# Patient Record
Sex: FEMALE | Race: WHITE | HISPANIC OR LATINO | Employment: FULL TIME | URBAN - METROPOLITAN AREA
[De-identification: names, ages, dates, MRNs, and addresses within clinical notes are randomized per-mention and may not be internally consistent; named-entity substitution may affect disease eponyms.]

---

## 2024-09-22 NOTE — PROGRESS NOTES
"    80 minutes spent by me on the date of the encounter doing chart review, history and exam, documentation and further activities per the note    New Medical Weight Management Consult    PATIENT:  Liya Moore  MRN:         [id number]  :         1975  [place]:         2024    Dear Primary Care Provider ,    I had the pleasure of seeing your patient, Liya Moore. Full intake/assessment was done to determine barriers to weight loss success and develop a treatment plan. Liya Moore is a 48 year old female interested in treatment of medical problems associated with excess weight. She has a height of 5' 3.5\", a weight of 213 lbs 9.6 oz, and the calculated Body mass index is 37.24 kg/m .            Assessment & Plan   Problem List Items Addressed This Visit       Hyperlipidemia    Relevant Medications    Phentermine-Topiramate 3.75-23 MG CP24    Phentermine-Topiramate 7.5-46 MG CP24 (Start on 10/14/2024)    Other Relevant Orders    Hemoglobin A1c    Comprehensive metabolic panel    Parathyroid Hormone Intact    Vitamin D Deficiency     Other Visit Diagnoses       Class 2 severe obesity with serious comorbidity and body mass index (BMI) of 37.0 to 37.9 in adult, unspecified obesity type (H)    -  Primary    Relevant Medications    Phentermine-Topiramate 3.75-23 MG CP24    Phentermine-Topiramate 7.5-46 MG CP24 (Start on 10/14/2024)    Other Relevant Orders    Hemoglobin A1c    Comprehensive metabolic panel    Parathyroid Hormone Intact    Vitamin D Deficiency             Plan  Start qsymia 3.75-23mg daily for 30 days, after that you can increase to 7.5-46mg going forward  Goals we discussed today:   Prioritizing protein- 90g protein daily   Working towards 8 hours of sleep if able  Labs ordered today: Pth, vitamin d, cmp,  A1c   Follow up with Mindy in 3 months   Dietician appointment scheduled 24         Anti-obesity medication started today for this patient "   QSYMIA  No history of kidney stones  No history of glaucoma   No issues with memory  No chronic kidney disease   No history of cardiovascular disease  No history of cardiac arrhthymias  No history of drug abuse  Caution would be needed with this medication due to HTN, levels currently well controlled. Will monitor closely.   .     Potential anti-obesity medications for this patient the future if there are issues with cost or side effects  CONTRAVE  DID NOT DISCUSS, could consider in the future   No current opioid use  No history of liver disease  No chronic pain  No history of bipolar disorder  No history of cardiovascular disease   No history of cardiac arrhythmias   No history of seizures  No history of bulimia nervosa  No history of anorexia nervosa  Caution would be needed with this medication due to HTN, could monitor closely   .  METFORMIN  DID NOT DISCUSS: per chart review she has taken in the past, did not discuss reasons for discontinuing. Could consider in the future   Has diagnosis of prediabetes   No history of chronic kidney disease  GFR >45  .    The following medications could be considered with caution for this patient   WEGOVY  No personal or family history of medullary thyroid carcinoma  No personal or family history of Multiple Endocrine Neoplasia Type 2  Caution would be needed with this medication due to history of 2 episodes of pancreatitis in 2017 and 2019, most likely related to significant hypertriglyceridemia. Would want Liya taking trigylceride-lowering medications and see triglycerides at or around 200 prior to considering glp-1 therapy   .           Liya Carrion Oscar is a 48 year old female who presents to clinic today for the following health issues.     She has the following co-morbidities:        9/23/2024    10:12 AM   --   I have the following health issues associated with obesity Pre-Diabetes    High Blood Pressure    High Cholesterol    Fatty Liver    Hypothyroidism  "  I have the following symptoms associated with obesity Knee Pain    Depression    Lower Extremity Swelling    Fatigue            No data to display                    9/23/2024    10:12 AM   Referring Provider   Please name the provider who referred you to Medical Weight Management  If you do not know, please answer \"I Don't Know\" i dont know       Overweight onset after pregnancies, then was diagnosed with  hypothyroidism which was associated with further weight gain.   Before pregnancies weighed 115-125 lbs. After pregnancies, saw rapid weight gain with diagnosis with the hypothyroidism, recalls 30-40lbs weight. Has since seen gradual weight gain, current weight of 213lbs is highest weight in life.     Has tried losing weight with eating less, following diets high in protein and vegetables, less sugar, but has not seen sustainable weight loss. Of note, notices dark marks under her eyes when she focused on lower sugar, eating vegetables and protein. Dark marks went away under her eyes when she resumed eating more sugar.     25 years ago tried 6 months of herbalife and saw some weight loss, but weight gain with stopping.         Comorbidities associated with weight gain include hypothyroidism, prediabetes, hyperlipidemia.   Additional health issues include 2 episode of pancreatitis in 2017 and in 2019- determined to be determined to related to familial hypertrigylceridemia (triglycerides greater than 5000 in 2017, 1597 in 2019). Has since taken fenofibrate intermittently, reporting that it's very expensive out of pocket. Is not currently taking fenofibrate, triglycerides as of June 2024 is 367.     Motivators for weight loss include improving health, feeling better.     She is interested in starting a medication as a tool for working towards sustainable weight loss.    Regarding eating patterns and diet, she typically eats 2 meals a day. Craves sweets, though tries to avoid them. Is able to get full. Can stay full " until next meal. Does struggle with portion control. Does experience food noise. Does not experience emotional eating. Does not a loss of control around eating .    Eats out/ gets take out 1-2 times  a week. Drinks black coffee with stevia, sometimes coconut milk, water, 2 sodas a week (sprite). No juice. Alcohol once a month, 1 kaya in a sitting.     Regarding activity, works in cleaning, very active, 8 hours 5 days a week. Outside of work goes walking 40 minutes a day, 4-5 times a week.         Past/ Current AOMs   Ronala life 25 years ago   Nothing prescribed           9/23/2024    10:12 AM   Weight History   How concerned are you about your weight? Very Concerned   I became overweight After Pregnancy   The following factors have contributed to my weight gain Mental Health Issues    Eating Too Much    Lack of Exercise    Genetic (Runs in the Family)    Stress   I have tried the following methods to lose weight Watching Portions or Calories    Exercise    Medications    Meal Replacements    Fasting   My lowest weight since age 18 was 140   My highest weight since age 18 was 220   The most weight I have ever lost was (lbs) 60   I have the following family history of obesity/being overweight I am the only one in my immediate family who is overweight   How has your weight changed over the last year? Gained           9/23/2024    10:12 AM   Diet Recall Review with Patient   If you do eat breakfast, what types of food do you eat? coffee eggs vegetables   If you do eat lunch, what types of food do you typically eat? tortillas salads protein   If you do eat supper, what types of food do you typically eat? tortillas meat protein   If you do snack, what types of food do you typically eat? nuts and fruits/seeds   How many glasses of juice do you drink in a typical day? 0   How many of glasses of milk do you drink in a typical day? 1   If you do drink milk, what type? Skim   How many 8oz glasses of sugar containing  drinks such as Gino-Aid/sweet tea do you drink in a day? 0   How many cans/bottles of sugar pop/soda/tea/sports drinks do you drink in a day? 1   How many cans/bottles of diet pop/soda/tea or sports drink do you drink in a day? 0   How often do you have a drink of alcohol? Never           9/23/2024    10:12 AM   Eating Habits   Generally, my meals include foods like these bread, pasta, rice, potatoes, corn, crackers, sweet dessert, pop, or juice A Few Times a Week   Eat at a buffet or sit-down restaurant Once a Week   Eat most of my meals in front of the TV or computer A Few Times a Week   Often skip meals, eat at random times, have no regular eating times Less Than Weekly   Rarely sit down for a meal but snack or graze throughout Once a Week   Eat extra snacks between meals Once a Week   Eat most of my food at the end of the day Almost Everyday   Eat in the middle of the night or wake up at night to eat Never   Eat extra snacks to prevent or correct low blood sugar A Few Times a Week   Eat to prevent acid reflux or stomach pain Never   Worry about not having enough food to eat Never   I eat when I am depressed A Few Times a Week   I eat when I am stressed A Few Times a Week   I eat when I am bored A Few Times a Week   I eat when I am anxious A Few Times a Week   I eat when I am happy or as a reward A Few Times a Week   I feel hungry all the time even if I just have eaten Almost Everyday   Feeling full is important to me Everyday   I finish all the food on my plate even if I am already full Everyday   I can't resist eating delicious food or walk past the good food/smell A Few Times a Week   I eat/snack without noticing that I am eating A Few Times a Week   I eat when I am preparing the meal Everyday   I eat more than usual when I see others eating Once a Week   I have trouble not eating sweets, ice cream, cookies, or chips if they are around the house Once a Week   I think about food all day Almost Everyday   What  foods, if any, do you crave? Chips/Crackers   Please list any other foods you crave? bread           9/23/2024    10:12 AM   Amount of Food   I feel out of control when eating Weekly   I eat a large amount of food, like a loaf of bread, a box of cookies, a pint/quart of ice cream, all at once Weekly   I eat a large amount of food even when I am not hungry Weekly   I eat rapidly Monthly   I eat alone because I feel embarrassed and do not want others to see how much I have eaten Never   I eat until I am uncomfortably full Weekly   I feel bad, disgusted, or guilty after I overeat Weekly           9/23/2024    10:12 AM   Activity/Exercise History   How much of a typical 12 hour day do you spend sitting? Less Than Half the Day   How much of a typical 12 hour day do you spend lying down? Less Than Half the Day   How much of a typical day do you spend walking/standing? Most of the Day   How many hours (not including work) do you spend on the TV/Video Games/Computer/Tablet/Phone? 4-5 Hours   How many times a week are you active for the purpose of exercise? Once a Week   What keeps you from being more active? Lack of Time    Too tired   How many total minutes do you spend doing some activity for the purpose of exercising when you exercise? 15-30 Minutes       PAST MEDICAL HISTORY:  No past medical history on file.        9/23/2024    10:12 AM   Work/Social History Reviewed With Patient   My employment status is Full-Time   My job is cleaning   How much of your job is spent on the computer or phone? Less Than 50%   How many hours do you spend commuting to work daily? 1   What is your marital status? /In a Relationship   If in a relationship, is your significant other overweight? No   If you have children, are they overweight? Yes   Who do you live with?  daughter son   Who does the food shopping? me       Marijuana use- none   Alcohol use -1 kaya   Caffeine use : coffee daily with stevia and coconut milk  "            9/23/2024    10:12 AM   Mental Health History Reviewed With Patient   Have you ever been physically or sexually abused? No   How often in the past 2 weeks have you felt little interest or pleasure in doing things? For Several Days   Over the past 2 weeks how often have you felt down, depressed, or hopeless? For Several Days             9/23/2024    10:12 AM   Sleep History Reviewed With Patient   How many hours do you sleep at night? 6   Estimates 6 hours of sleep, struggles to get more due to busy schedule. Feels a little tired in the morning.   Works late nights, gets to bed around 1-2am.     MEDICATIONS:   Current Outpatient Medications   Medication Sig Dispense Refill    acetaminophen (TYLENOL) 325 MG tablet 2 tablets as needed Orally every 4 hrs      labetalol (NORMODYNE) 100 MG tablet Take 100 mg by mouth.      levothyroxine (SYNTHROID/LEVOTHROID) 175 MCG tablet Take 175 mcg by mouth daily.      Phentermine-Topiramate 3.75-23 MG CP24 Take 1 tablet by mouth daily. For 1 month 30 capsule 0    [START ON 10/14/2024] Phentermine-Topiramate 7.5-46 MG CP24 Take 1 tablet by mouth daily. After completing 30 days of lower dose 30 capsule 1    vitamin D2 (ERGOCALCIFEROL) 94831 units (1250 mcg) capsule       Vitamin D3 (CHOLECALCIFEROL) 25 mcg (1000 units) tablet Take 1,000 Units by mouth.      citalopram (CELEXA) 20 MG tablet 1 tablet Orally Once a day             ALLERGIES:   No Known Allergies        9/23/2024     9:37 AM   NIRALI Score (Last Two)   NIRALI Raw Score 30   Activation Score 56   NIRALI Level 3               Objective    /85 (BP Location: Left arm, Patient Position: Chair, Cuff Size: Adult Large)   Pulse 58   Ht 1.613 m (5' 3.5\")   Wt 96.9 kg (213 lb 9.6 oz)   SpO2 100%   BMI 37.24 kg/m    /85 (BP Location: Left arm, Patient Position: Chair, Cuff Size: Adult Large)   Pulse 58   Ht 1.613 m (5' 3.5\")   Wt 96.9 kg (213 lb 9.6 oz)   SpO2 100%   BMI 37.24 kg/m    Body mass index is " 37.24 kg/m .  Physical Exam   GENERAL: alert and no distress  EYES: Eyes grossly normal to inspection.  No discharge or erythema, or obvious scleral/conjunctival abnormalities.  RESP: No audible wheeze, cough, or visible cyanosis.    SKIN: Visible skin clear. No significant rash, abnormal pigmentation or lesions.  NEURO: Cranial nerves grossly intact.  Mentation and speech appropriate for age.  PSYCH: Appropriate affect, tone, and pace of words     Anti-obesity medication ROS:    HEENT  Hx of glaucoma: No    Cardiovascular  CAD:No  HTN:Yes      Gastrointestinal  GERD:No  Constipation: occasionally, doesn't take anything for it   Liver Dz:No  H/O Pancreatitis:Yes see hpi     Psychiatric  Bipolar: No  Anxiety:No  Depression:No  History of alcohol/drug abuse: No  Hx of eating disorder:No    Endocrine  Personal or family hx of MTC or MEN2:No  Diabetes/prediabetes: Yes prediabetes     Neurologic:  Hx of seizures: No  Hx of migraines: No  Memory Impairment: No  CVA history: No      History of kidney stones: No  Kidney disease: No  Current birth control: No no period for the last 2 years   Interested in future pregnancy: No    Taking Opioid/Narcotic: No        Sincerely,    Mindy Morgan PA-C     The longitudinal plan of care for the diagnosis(es)/condition(s) as documented were addressed during this visit. Due to the added complexity in care, I will continue to support Liya in the subsequent management and with ongoing continuity of care.

## 2024-09-23 ENCOUNTER — APPOINTMENT (OUTPATIENT)
Dept: LAB | Facility: CLINIC | Age: 49
End: 2024-09-23
Payer: COMMERCIAL

## 2024-09-23 ENCOUNTER — TELEPHONE (OUTPATIENT)
Dept: ENDOCRINOLOGY | Facility: CLINIC | Age: 49
End: 2024-09-23

## 2024-09-23 ENCOUNTER — OFFICE VISIT (OUTPATIENT)
Dept: ENDOCRINOLOGY | Facility: CLINIC | Age: 49
End: 2024-09-23
Payer: COMMERCIAL

## 2024-09-23 VITALS
SYSTOLIC BLOOD PRESSURE: 128 MMHG | BODY MASS INDEX: 36.47 KG/M2 | DIASTOLIC BLOOD PRESSURE: 85 MMHG | WEIGHT: 213.6 LBS | HEIGHT: 64 IN | OXYGEN SATURATION: 100 % | HEART RATE: 58 BPM

## 2024-09-23 DIAGNOSIS — E66.01 CLASS 2 SEVERE OBESITY WITH SERIOUS COMORBIDITY AND BODY MASS INDEX (BMI) OF 37.0 TO 37.9 IN ADULT, UNSPECIFIED OBESITY TYPE (H): Primary | ICD-10-CM

## 2024-09-23 DIAGNOSIS — E78.5 HYPERLIPIDEMIA, UNSPECIFIED HYPERLIPIDEMIA TYPE: ICD-10-CM

## 2024-09-23 DIAGNOSIS — E66.812 CLASS 2 SEVERE OBESITY WITH SERIOUS COMORBIDITY AND BODY MASS INDEX (BMI) OF 37.0 TO 37.9 IN ADULT, UNSPECIFIED OBESITY TYPE (H): Primary | ICD-10-CM

## 2024-09-23 DIAGNOSIS — R79.89 LOW VITAMIN D LEVEL: ICD-10-CM

## 2024-09-23 PROBLEM — K85.90 PANCREATITIS, UNSPECIFIED PANCREATITIS TYPE: Status: ACTIVE | Noted: 2017-07-05

## 2024-09-23 PROBLEM — R63.5 WEIGHT GAIN: Status: ACTIVE | Noted: 2023-04-27

## 2024-09-23 PROBLEM — E78.00 PURE HYPERCHOLESTEROLEMIA: Status: ACTIVE | Noted: 2023-05-11

## 2024-09-23 PROBLEM — M26.4 MALOCCLUSION OF TEETH: Status: ACTIVE | Noted: 2017-01-03

## 2024-09-23 PROBLEM — L98.9 SKIN LESION: Status: ACTIVE | Noted: 2024-07-30

## 2024-09-23 PROBLEM — Z87.19 HISTORY OF PANCREATITIS: Status: ACTIVE | Noted: 2024-01-11

## 2024-09-23 PROBLEM — N83.519 OVARIAN TORSION: Status: ACTIVE | Noted: 2018-03-31

## 2024-09-23 PROBLEM — R89.9 ABNORMAL LABORATORY TEST RESULT: Status: ACTIVE | Noted: 2023-05-11

## 2024-09-23 PROBLEM — Z72.820 SLEEP DEPRIVATION: Status: ACTIVE | Noted: 2023-05-11

## 2024-09-23 LAB
ALBUMIN SERPL BCG-MCNC: 4.3 G/DL (ref 3.5–5.2)
ALP SERPL-CCNC: 120 U/L (ref 40–150)
ALT SERPL W P-5'-P-CCNC: 28 U/L (ref 0–50)
ANION GAP SERPL CALCULATED.3IONS-SCNC: 10 MMOL/L (ref 7–15)
AST SERPL W P-5'-P-CCNC: 23 U/L (ref 0–45)
BILIRUB SERPL-MCNC: 0.2 MG/DL
BUN SERPL-MCNC: 13.8 MG/DL (ref 6–20)
CALCIUM SERPL-MCNC: 9.5 MG/DL (ref 8.8–10.4)
CHLORIDE SERPL-SCNC: 107 MMOL/L (ref 98–107)
CREAT SERPL-MCNC: 0.64 MG/DL (ref 0.51–0.95)
EGFRCR SERPLBLD CKD-EPI 2021: >90 ML/MIN/1.73M2
EST. AVERAGE GLUCOSE BLD GHB EST-MCNC: 134 MG/DL
GLUCOSE SERPL-MCNC: 97 MG/DL (ref 70–99)
HBA1C MFR BLD: 6.3 %
HCO3 SERPL-SCNC: 23 MMOL/L (ref 22–29)
POTASSIUM SERPL-SCNC: 4.5 MMOL/L (ref 3.4–5.3)
PROT SERPL-MCNC: 8 G/DL (ref 6.4–8.3)
PTH-INTACT SERPL-MCNC: 70 PG/ML (ref 15–65)
SODIUM SERPL-SCNC: 140 MMOL/L (ref 135–145)
VIT D+METAB SERPL-MCNC: 19 NG/ML (ref 20–50)

## 2024-09-23 PROCEDURE — 36415 COLL VENOUS BLD VENIPUNCTURE: CPT | Performed by: PATHOLOGY

## 2024-09-23 PROCEDURE — G2211 COMPLEX E/M VISIT ADD ON: HCPCS

## 2024-09-23 PROCEDURE — 82306 VITAMIN D 25 HYDROXY: CPT

## 2024-09-23 PROCEDURE — 80053 COMPREHEN METABOLIC PANEL: CPT | Performed by: PATHOLOGY

## 2024-09-23 PROCEDURE — 83970 ASSAY OF PARATHORMONE: CPT | Performed by: PATHOLOGY

## 2024-09-23 PROCEDURE — 99000 SPECIMEN HANDLING OFFICE-LAB: CPT | Performed by: PATHOLOGY

## 2024-09-23 PROCEDURE — 83036 HEMOGLOBIN GLYCOSYLATED A1C: CPT

## 2024-09-23 PROCEDURE — T1013 SIGN LANG/ORAL INTERPRETER: HCPCS | Mod: GT

## 2024-09-23 PROCEDURE — 99205 OFFICE O/P NEW HI 60 MIN: CPT

## 2024-09-23 PROCEDURE — 99417 PROLNG OP E/M EACH 15 MIN: CPT

## 2024-09-23 RX ORDER — LABETALOL 100 MG/1
100 TABLET, FILM COATED ORAL
COMMUNITY

## 2024-09-23 RX ORDER — VITAMIN B COMPLEX
1000 TABLET ORAL
COMMUNITY
Start: 2022-12-29

## 2024-09-23 RX ORDER — CITALOPRAM HYDROBROMIDE 20 MG/1
TABLET ORAL
COMMUNITY

## 2024-09-23 RX ORDER — ACETAMINOPHEN 325 MG/1
TABLET ORAL
COMMUNITY

## 2024-09-23 RX ORDER — ERGOCALCIFEROL 1.25 MG/1
50000 CAPSULE, LIQUID FILLED ORAL WEEKLY
Qty: 12 CAPSULE | Refills: 0 | Status: SHIPPED | OUTPATIENT
Start: 2024-09-23

## 2024-09-23 RX ORDER — LEVOTHYROXINE SODIUM 175 UG/1
175 TABLET ORAL DAILY
COMMUNITY

## 2024-09-23 RX ORDER — ERGOCALCIFEROL 1.25 MG/1
CAPSULE, LIQUID FILLED ORAL
COMMUNITY
Start: 2024-07-02 | End: 2024-09-23

## 2024-09-23 ASSESSMENT — SLEEP AND FATIGUE QUESTIONNAIRES
HOW LIKELY ARE YOU TO NOD OFF OR FALL ASLEEP WHILE SITTING QUIETLY AFTER LUNCH WITHOUT ALCOHOL: SLIGHT CHANCE OF DOZING
HOW LIKELY ARE YOU TO NOD OFF OR FALL ASLEEP WHILE SITTING INACTIVE IN A PUBLIC PLACE: WOULD NEVER DOZE
HOW LIKELY ARE YOU TO NOD OFF OR FALL ASLEEP WHILE WATCHING TV: SLIGHT CHANCE OF DOZING
HOW LIKELY ARE YOU TO NOD OFF OR FALL ASLEEP IN A CAR, WHILE STOPPED FOR A FEW MINUTES IN TRAFFIC: WOULD NEVER DOZE
HOW LIKELY ARE YOU TO NOD OFF OR FALL ASLEEP WHILE SITTING AND TALKING TO SOMEONE: WOULD NEVER DOZE
HOW LIKELY ARE YOU TO NOD OFF OR FALL ASLEEP WHILE LYING DOWN TO REST IN THE AFTERNOON WHEN CIRCUMSTANCES PERMIT: SLIGHT CHANCE OF DOZING
HOW LIKELY ARE YOU TO NOD OFF OR FALL ASLEEP WHILE SITTING AND READING: SLIGHT CHANCE OF DOZING
HOW LIKELY ARE YOU TO NOD OFF OR FALL ASLEEP WHEN YOU ARE A PASSENGER IN A CAR FOR AN HOUR WITHOUT A BREAK: SLIGHT CHANCE OF DOZING

## 2024-09-23 ASSESSMENT — PAIN SCALES - GENERAL: PAINLEVEL: NO PAIN (0)

## 2024-09-23 NOTE — NURSING NOTE
"Chief Complaint   Patient presents with    New Patient     New MWM consult       Vitals:    09/23/24 1014   BP: 128/85   BP Location: Left arm   Patient Position: Chair   Cuff Size: Adult Large   Pulse: 58   SpO2: 100%   Weight: 96.9 kg (213 lb 9.6 oz)   Height: 1.613 m (5' 3.5\")       Body mass index is 37.24 kg/m .                              "

## 2024-09-23 NOTE — TELEPHONE ENCOUNTER
"Prior Authorization Retail Medication Request    Medication/Dose: Qsymia  Diagnosis and ICD code (if different than what is on RX):    Class 2 severe obesity with serious comorbidity and body mass index (BMI) of 37.0 to 37.9 in adult, unspecified obesity type [E66.01, Z68.37]  - Primary      Hyperlipidemia, unspecified hyperlipidemia type [E78.5]          New/renewal/insurance change PA/secondary ins. PA:  Previously Tried and Failed:  history of diet and exercise, herbalife  Rationale:  I had the pleasure of seeing your patient, Liya Moore. Full intake/assessment was done to determine barriers to weight loss success and develop a treatment plan. Liya Moore is a 48 year old female interested in treatment of medical problems associated with excess weight. She has a height of 5' 3.5\", a weight of 213 lbs 9.6 oz, and the calculated Body mass index is 37.24 kg/m . She has the following co-morbidities: Pre-Diabetes    High Blood Pressure    High Cholesterol    Fatty Liver    Hypothyroidism    Overweight onset after pregnancies, then was diagnosed with  hypothyroidism which was associated with further weight gain.   Before pregnancies weighed 115-125 lbs. Saw rapid weight gain with diagnosis with the hypothyroidism, recalls 30-40lbs. Has since seen gradual weight gain, current weight is 213lbs.      Has tried losing weight with eating less, following diets high in protein and vegetables, less sugar, but has not seen sustainable weight loss. Of note, notices dark marks under her eyes when she focused on lower sugar, eating vegetables and protein. Dark marks went away under her eyes when she resumed eating more sugar.         25 years tried 6 months of herbalife and saw some weight loss, but weight gain with stopping.      Current weight of 213lbs is highest weight in life.      Comorbidities associated with weight gain include hypothyroidism, prediabetes, hyperlipidemia.   Additional health " issues include one episode of pancreatitis- 1 week after removal of appendix- determined to be possible complication related to surgery, hypertriglyceridemia.       Motivators for weight loss include improving health, feeling better.      She is interested in starting a medication as a tool for working towards sustainable weight loss.     Regarding eating patterns and diet, she typically eats 2 meals a day. Craves sweets, though tries to avoid them. Is able to get full. Can stay full until next meal. Does struggle with portion control. Does experience food noise. Does not experience emotional eating. Does not a loss of control around eating .     Eats out/ gets take out 1-2 times  a week. Drinks black coffee with stevia, sometimes coconut milk, water, 2 sodas a week (sprite). No juice. Alcohol once a month, 1 kaya in a sitting.      Regarding activity, works in cleaning, very active, 8 hours 5 days a week. Outside of work goes walking 40 minutes a day, 4-5 times a week.         Past/ Current AOMs   Herba life 25 years ago   Nothing prescribed     Insurance   Primary:   Insurance ID:      Secondary (if applicable):  Insurance ID:      Pharmacy Information (if different than what is on RX)  Name:    [id number] [place] [facility] [address] [facility]     Phone:  [phone]   Fax: [phone]

## 2024-09-23 NOTE — PATIENT INSTRUCTIONS
"Thank you for allowing us the privilege of caring for you. We hope we provided you with the excellent service you deserve.   Please let us know if there is anything else we can do for you so that we can be sure you are completely satisfied with your care experience.    To ensure the quality of our services you may be receiving a patient satisfaction survey from an independent patient satisfaction monitoring company.    The greatest compliment you can give is a \"Likely to Recommend\"    Your visit was with Mindy Morgan PA-C today.    Instructions per today's visit:     Devante Moore, it was great to visit with you today.  Here is a review of our visit.  If our clinic scheduler is not able to reach you please call [phone] to schedule your next appointments.    Plan  Start qsymia 3.75-23mg daily for 30 days, after that you can increase to 7.5-46mg going forward  Goals we discussed today:   Prioritizing protein- 90g protein daily   Working towards 8 hours of sleep if able  Labs ordered today: Pth, vitamin d, cmp,  A1c   Follow up with Mindy in 3 months   Dietician appointment scheduled 9/26/24       Information about Video Visits with [facility]: video visit information  _________________________________________________________________________________________________________________________________________________________  If you are asked by your clinic team to have your blood pressure checked:  [facility] do offer several locations for blood pressure checks. Please follow the below link to schedule an appointment. Scheduling an appointment at the pharmacy for a blood pressure check is now preferred.    Appointment Plus (appointment-plus.[employer])  _________________________________________________________________________________________________________________________________________________________  Important contact and scheduling information:  Please call our contact center at " [phone] to schedule your next appointments.  To find a lab location near you, please call [phone].  For any nursing questions or concerns call Mily Duncan LPN at [phone] or Macey Mccullough RN at [phone]  Please call during clinic hours Monday through Friday 8:00a - 4:00p if you have questions or you can contact us via [employer] at anytime and we will reply during clinic hours.    Lab results will be communicated through My Chart or letter (if My Chart not used). Please call the clinic if you have not received communication after 1 week or if you have any questions.?  Clinic Fax: [phone]    _________________________________________________________________________________________________________________________________________________________  Meal Replacement Products:    Here is the link to our new e-store where you can purchase our meal replacement products    [facility] E-Store  [email or web address]    The one week starter kit is a great way to sample a variety of products and see what works for you.    If you want more information about the product go to: [email or web address]    If you are an employee or [facility] or [facility] please contact your care team for a 10% estore discount    Free Shipping for orders over $75     Benefits of meal replacements products:    Portion and calorie control  Improved nutrition  Structured eating  Simplified food choices  Avoid contact with trigger foods  _________________________________________________________________________________________________________________________________________________________  Interested in working with a health ?  Health coaches work with you to improve your overall health and wellbeing.  They look at the whole person, and may involve discussion of different areas of life, including, but not limited to the four pillars of health (sleep, exercise, nutrition, and  stress management). Discuss with your care team if you would like to start working a health .  Health Coaching-3 Pack: Schedule by calling [phone]    $99 for three health coaching visits    Visits may be done in person or via phone    Coaching is a partnership between the  and the client; Coaches do not prescribe or diagnose    Coaching helps inspire the client to reach his/her personal goals   _________________________________________________________________________________________________________________________________________________________  24 Week Healthy Lifestyle Plan:    Our mission in the 24-week Healthy Lifestyle Plan is to provide you with individualized care by giving you the tools, education and support you need to lose weight and maintain a healthy lifestyle. In your 24-week journey, you ll be supported by a dedicated weight loss team that includes registered dietitians, medical weight management providers, health coaches, and nurses -- all with special expertise in weight loss -- to help you every step of the way.     Monthly meetings with your registered dietician or medical weight management provider help to review your progress, update your care plan, and make any adjustments needed to ensure success. Between these visits, weekly and bi-weekly health  visits will help you focus on the four pillars of weight loss -- stress, sleep, nutrition, and exercise -- and how you can best adapt each to achieve sustainable weight loss results.    In addition, you will be given exclusive access to online wellbeing classes through [employer].  Your initial visit will be with a medical weight management provider who will help to understand your weight loss goals and ensure this program is the right fit for you. Please let our team know if you are interested in the 24 week plan by sending a message to your care team or calling [phone] to  schedule.  _________________________________________________________________________________________________________________________________________________________  __________  [facility] Get Moving Program  Our team of physical therapists is trained to help you understand and take control of your condition. They will perform a thorough evaluation to determine your ability for activity and develop a customized plan to fit your goals and physical ability.  Scheduling: Unsure if the Get Moving program is right for you? Discuss the program with your medical provider or diabetes educator. You can also call us at [phone] to ask questions or schedule an appointment.   [facility] Get Moving Program  ____________________________________________________________________________________________________________________________________________________________________________   [facility] Diabetes Prevention Program (DPP)  If you have prediabetes and Medicare please contact us via [employer] to learn more about the Diabetes Prevention Program (DPP)  Program Details:   [facility] offers the year-long Diabetes Prevention Program (DPP). The program helps you to make lifestyle changes that prevent or delay type 2 diabetes by supporting healthy eating, increased physical activity, stress reduction and use of coping skills.   On average, previous [facility] DPP cohorts have lost and maintained at least 5% of their starting weight throughout the program and averaged more than 150 minutes of physical activity per week.  Participants meet weekly for one-hour group sessions over sixteen weeks, every other week for the next 8 weeks, and monthly for the last six months.   A year-long maintenance program is also available for participants who complete the first year.   Location & Cost:   During the COVID-19 Public Health Emergency, the program is offered virtually. When in-person classes can resume, they  will be held at [facility].  For people with Medicare, the program is covered in full. A self-pay option will also be available for those with non-Medicare insurance plans.   ______________________________________________________________________________________________________________________________________________________________________________________________________________________________    To work with a Behavioral Health Psychologist:    Call to schedule:    Amish Will - [phone]  Troy Nunez - [phone]  Adeline Hansen - [phone]  Rachel Rust - [phone]   Jenae Ulloa PhD (cannot accept Medicare) [phone]        Thank you,   [facility] Comprehensive Weight Management Team        QSYMIA (phentermine and topiramate)    We are considering starting Qsymia. This is a specific obesity medication and is a combination of sustained-release Phentermine and Topiramate. Qsymia is taken once daily in the morning.  There are a few different doses of the medication. Doses come in 3.75/23 mg (usually skipped), 7.5/46 mg, 11.25/69 mg, and 15/92 mg.     For some of our patients, the pills work right away. They feel and think quite differently about food. Other patients don't feel much of a change but find in fact they have lost weight! Like all weight loss medications, Qsymia works best when you help it work.  This means:    1) Have less tempting high calorie (fattening) food around the house or office    2) Have lower calorie food (fruits, vegetables,low fat meats and dairy) for snacks    3) Eat out only one time or less each week.   4) Eat your meals at a table with the TV or computer off.    Side-effects (generally well tolerated because it is a sustained release medication)    Topiramate:   -Tingling in hands,feet, or face (usually not very troublesome)   -Mental confusion and word finding trouble (about 10% of patients have this)     -Feeling sleepy  or a bit dopey- this goes away very soon after starting      Phentermine:    -Feelings of racing pulse or rapid heart beat   -Increased anxiety/jitteriness   -Insomnia   -Dry mouth, constipation    -Some people can get an elevated blood pressure. Please have your blood pressure rechecked 1-2 weeks after starting Qsymia and report results back to the clinic via [employer]    One of the dangers of topiramate is the possibility of birth defects--if you get pregnant when you are on it, there is the risk that your baby will be born with a cleft lip or palate.  If you are on topiramate and of child bearing age, you need to be on a reliable form of birth control or refrain from sexual intercourse.     Prior Authorization Process for Weight Management Medications: You are being prescribed a medication that will likely need to undergo a prior authorization.  A prior authorization is when the clinic needs to fill out a form that is sent to your insurance company to obtain coverage for that medication. The prescription will NOT automatically go to your pharmacy today if it needs a Prior Authorization. If the medication prior authorization is approved, the care team will contact you and the prescription will be released to your pharmacy. If denied, we will work to try and appeal the prior authorization if possible. The initial prior authorization process takes up to 5-10 business days and appeals can take up to 30 days. If you do not hear from us at the end of that time, you may contact the clinic.    If insurance does not cover Qsymia, typically costs around $150-$200 per month. Please check out the website [email or web address] and sign up for the Pharmacy savings card or if you would like to use [facility] for a lower copay.     For any questions or concerns please send a [employer] message to our team or call our weight management call center at [phone] during regular business hours. For questions during  evenings or weekends your messages will be addressed during the next business day.  For emergencies please call 911 or seek immediate medical care.     Do not stop taking it if you don't think it's working. For some people it works even though they do not feel much different.    In order to get refills of this or any medication we prescribe you must be seen in the medical weight mgmt clinic every 2-4 months. Please have your pharmacy fax a refill request to [phone].

## 2024-09-23 NOTE — LETTER
"2024       RE: Liya Moore  [address]  [place] [address]     Dear Colleague,    Thank you for referring your patient, Liya Moore, to the [facility]. Please see a copy of my visit note below.        80 minutes spent by me on the date of the encounter doing chart review, history and exam, documentation and further activities per the note    New Medical Weight Management Consult    PATIENT:  Liya Moore  MRN:         [id number]  :         1975  [place]:         2024    Dear Primary Care Provider ,    I had the pleasure of seeing your patient, Liya Moore. Full intake/assessment was done to determine barriers to weight loss success and develop a treatment plan. Liya Moore is a 48 year old female interested in treatment of medical problems associated with excess weight. She has a height of 5' 3.5\", a weight of 213 lbs 9.6 oz, and the calculated Body mass index is 37.24 kg/m .            Assessment & Plan  Problem List Items Addressed This Visit       Hyperlipidemia    Relevant Medications    Phentermine-Topiramate 3.75-23 MG CP24    Phentermine-Topiramate 7.5-46 MG CP24 (Start on 10/14/2024)    Other Relevant Orders    Hemoglobin A1c    Comprehensive metabolic panel    Parathyroid Hormone Intact    Vitamin D Deficiency     Other Visit Diagnoses       Class 2 severe obesity with serious comorbidity and body mass index (BMI) of 37.0 to 37.9 in adult, unspecified obesity type (H)    -  Primary    Relevant Medications    Phentermine-Topiramate 3.75-23 MG CP24    Phentermine-Topiramate 7.5-46 MG CP24 (Start on 10/14/2024)    Other Relevant Orders    Hemoglobin A1c    Comprehensive metabolic panel    Parathyroid Hormone Intact    Vitamin D Deficiency             Plan  Start qsymia 3.75-23mg daily for 30 days, after that you can " increase to 7.5-46mg going forward  Goals we discussed today:   Prioritizing protein- 90g protein daily   Working towards 8 hours of sleep if able  Labs ordered today: Pth, vitamin d, cmp,  A1c   Follow up with Mindy in 3 months   Dietician appointment scheduled 9/26/24         Anti-obesity medication started today for this patient   QSYMIA  No history of kidney stones  No history of glaucoma   No issues with memory  No chronic kidney disease   No history of cardiovascular disease  No history of cardiac arrhthymias  No history of drug abuse  Caution would be needed with this medication due to HTN, levels currently well controlled. Will monitor closely.   .     Potential anti-obesity medications for this patient the future if there are issues with cost or side effects  CONTRAVE  DID NOT DISCUSS, could consider in the future   No current opioid use  No history of liver disease  No chronic pain  No history of bipolar disorder  No history of cardiovascular disease   No history of cardiac arrhythmias   No history of seizures  No history of bulimia nervosa  No history of anorexia nervosa  Caution would be needed with this medication due to HTN, could monitor closely   .  METFORMIN  DID NOT DISCUSS: per chart review she has taken in the past, did not discuss reasons for discontinuing. Could consider in the future   Has diagnosis of prediabetes   No history of chronic kidney disease  GFR >45  .    The following medications could be considered with caution for this patient   WEGOVY  No personal or family history of medullary thyroid carcinoma  No personal or family history of Multiple Endocrine Neoplasia Type 2  Caution would be needed with this medication due to history of 2 episodes of pancreatitis in 2017 and 2019, most likely related to significant hypertriglyceridemia. Would want Liya taking trigylceride-lowering medications and see triglycerides at or around 200 prior to considering glp-1 therapy   .  "          Liya Moore is a 48 year old female who presents to clinic today for the following health issues.     She has the following co-morbidities:        9/23/2024    10:12 AM   --   I have the following health issues associated with obesity Pre-Diabetes    High Blood Pressure    High Cholesterol    Fatty Liver    Hypothyroidism   I have the following symptoms associated with obesity Knee Pain    Depression    Lower Extremity Swelling    Fatigue            No data to display                    9/23/2024    10:12 AM   Referring Provider   Please name the provider who referred you to Medical Weight Management  If you do not know, please answer \"I Don't Know\" i dont know       Overweight onset after pregnancies, then was diagnosed with  hypothyroidism which was associated with further weight gain.   Before pregnancies weighed 115-125 lbs. After pregnancies, saw rapid weight gain with diagnosis with the hypothyroidism, recalls 30-40lbs weight. Has since seen gradual weight gain, current weight of 213lbs is highest weight in life.     Has tried losing weight with eating less, following diets high in protein and vegetables, less sugar, but has not seen sustainable weight loss. Of note, notices dark marks under her eyes when she focused on lower sugar, eating vegetables and protein. Dark marks went away under her eyes when she resumed eating more sugar.     25 years ago tried 6 months of herbalife and saw some weight loss, but weight gain with stopping.         Comorbidities associated with weight gain include hypothyroidism, prediabetes, hyperlipidemia.   Additional health issues include 2 episode of pancreatitis in 2017 and in 2019- determined to be determined to related to familial hypertrigylceridemia (triglycerides greater than 5000 in 2017, 1597 in 2019). Has since taken fenofibrate intermittently, reporting that it's very expensive out of pocket. Is not currently taking fenofibrate, triglycerides as " of June 2024 is 367.     Motivators for weight loss include improving health, feeling better.     She is interested in starting a medication as a tool for working towards sustainable weight loss.    Regarding eating patterns and diet, she typically eats 2 meals a day. Craves sweets, though tries to avoid them. Is able to get full. Can stay full until next meal. Does struggle with portion control. Does experience food noise. Does not experience emotional eating. Does not a loss of control around eating .    Eats out/ gets take out 1-2 times  a week. Drinks black coffee with stevia, sometimes coconut milk, water, 2 sodas a week (sprite). No juice. Alcohol once a month, 1 kaya in a sitting.     Regarding activity, works in cleaning, very active, 8 hours 5 days a week. Outside of work goes walking 40 minutes a day, 4-5 times a week.         Past/ Current AOMs   Pretty life 25 years ago   Nothing prescribed           9/23/2024    10:12 AM   Weight History   How concerned are you about your weight? Very Concerned   I became overweight After Pregnancy   The following factors have contributed to my weight gain Mental Health Issues    Eating Too Much    Lack of Exercise    Genetic (Runs in the Family)    Stress   I have tried the following methods to lose weight Watching Portions or Calories    Exercise    Medications    Meal Replacements    Fasting   My lowest weight since age 18 was 140   My highest weight since age 18 was 220   The most weight I have ever lost was (lbs) 60   I have the following family history of obesity/being overweight I am the only one in my immediate family who is overweight   How has your weight changed over the last year? Gained           9/23/2024    10:12 AM   Diet Recall Review with Patient   If you do eat breakfast, what types of food do you eat? coffee eggs vegetables   If you do eat lunch, what types of food do you typically eat? tortillas salads protein   If you do eat supper, what types  of food do you typically eat? tortillas meat protein   If you do snack, what types of food do you typically eat? nuts and fruits/seeds   How many glasses of juice do you drink in a typical day? 0   How many of glasses of milk do you drink in a typical day? 1   If you do drink milk, what type? Skim   How many 8oz glasses of sugar containing drinks such as Gino-Aid/sweet tea do you drink in a day? 0   How many cans/bottles of sugar pop/soda/tea/sports drinks do you drink in a day? 1   How many cans/bottles of diet pop/soda/tea or sports drink do you drink in a day? 0   How often do you have a drink of alcohol? Never           9/23/2024    10:12 AM   Eating Habits   Generally, my meals include foods like these bread, pasta, rice, potatoes, corn, crackers, sweet dessert, pop, or juice A Few Times a Week   Eat at a buffet or sit-down restaurant Once a Week   Eat most of my meals in front of the TV or computer A Few Times a Week   Often skip meals, eat at random times, have no regular eating times Less Than Weekly   Rarely sit down for a meal but snack or graze throughout Once a Week   Eat extra snacks between meals Once a Week   Eat most of my food at the end of the day Almost Everyday   Eat in the middle of the night or wake up at night to eat Never   Eat extra snacks to prevent or correct low blood sugar A Few Times a Week   Eat to prevent acid reflux or stomach pain Never   Worry about not having enough food to eat Never   I eat when I am depressed A Few Times a Week   I eat when I am stressed A Few Times a Week   I eat when I am bored A Few Times a Week   I eat when I am anxious A Few Times a Week   I eat when I am happy or as a reward A Few Times a Week   I feel hungry all the time even if I just have eaten Almost Everyday   Feeling full is important to me Everyday   I finish all the food on my plate even if I am already full Everyday   I can't resist eating delicious food or walk past the good food/smell A Few  Times a Week   I eat/snack without noticing that I am eating A Few Times a Week   I eat when I am preparing the meal Everyday   I eat more than usual when I see others eating Once a Week   I have trouble not eating sweets, ice cream, cookies, or chips if they are around the house Once a Week   I think about food all day Almost Everyday   What foods, if any, do you crave? Chips/Crackers   Please list any other foods you crave? bread           9/23/2024    10:12 AM   Amount of Food   I feel out of control when eating Weekly   I eat a large amount of food, like a loaf of bread, a box of cookies, a pint/quart of ice cream, all at once Weekly   I eat a large amount of food even when I am not hungry Weekly   I eat rapidly Monthly   I eat alone because I feel embarrassed and do not want others to see how much I have eaten Never   I eat until I am uncomfortably full Weekly   I feel bad, disgusted, or guilty after I overeat Weekly           9/23/2024    10:12 AM   Activity/Exercise History   How much of a typical 12 hour day do you spend sitting? Less Than Half the Day   How much of a typical 12 hour day do you spend lying down? Less Than Half the Day   How much of a typical day do you spend walking/standing? Most of the Day   How many hours (not including work) do you spend on the TV/Video Games/Computer/Tablet/Phone? 4-5 Hours   How many times a week are you active for the purpose of exercise? Once a Week   What keeps you from being more active? Lack of Time    Too tired   How many total minutes do you spend doing some activity for the purpose of exercising when you exercise? 15-30 Minutes       PAST MEDICAL HISTORY:  No past medical history on file.        9/23/2024    10:12 AM   Work/Social History Reviewed With Patient   My employment status is Full-Time   My job is cleaning   How much of your job is spent on the computer or phone? Less Than 50%   How many hours do you spend commuting to work daily? 1   What is your  marital status? /In a Relationship   If in a relationship, is your significant other overweight? No   If you have children, are they overweight? Yes   Who do you live with?  daughter son   Who does the food shopping? me       Marijuana use- none   Alcohol use -1 kaya   Caffeine use : coffee daily with stevia and coconut milk             9/23/2024    10:12 AM   Mental Health History Reviewed With Patient   Have you ever been physically or sexually abused? No   How often in the past 2 weeks have you felt little interest or pleasure in doing things? For Several Days   Over the past 2 weeks how often have you felt down, depressed, or hopeless? For Several Days             9/23/2024    10:12 AM   Sleep History Reviewed With Patient   How many hours do you sleep at night? 6   Estimates 6 hours of sleep, struggles to get more due to busy schedule. Feels a little tired in the morning.   Works late nights, gets to bed around 1-2am.     MEDICATIONS:   Current Outpatient Medications   Medication Sig Dispense Refill     acetaminophen (TYLENOL) 325 MG tablet 2 tablets as needed Orally every 4 hrs       labetalol (NORMODYNE) 100 MG tablet Take 100 mg by mouth.       levothyroxine (SYNTHROID/LEVOTHROID) 175 MCG tablet Take 175 mcg by mouth daily.       Phentermine-Topiramate 3.75-23 MG CP24 Take 1 tablet by mouth daily. For 1 month 30 capsule 0     [START ON 10/14/2024] Phentermine-Topiramate 7.5-46 MG CP24 Take 1 tablet by mouth daily. After completing 30 days of lower dose 30 capsule 1     vitamin D2 (ERGOCALCIFEROL) 69005 units (1250 mcg) capsule        Vitamin D3 (CHOLECALCIFEROL) 25 mcg (1000 units) tablet Take 1,000 Units by mouth.       citalopram (CELEXA) 20 MG tablet 1 tablet Orally Once a day             ALLERGIES:   No Known Allergies        9/23/2024     9:37 AM   NIRALI Score (Last Two)   NIRALI Raw Score 30   Activation Score 56   NIRALI Level 3               Objective   /85 (BP Location: Left arm,  "Patient Position: Chair, Cuff Size: Adult Large)   Pulse 58   Ht 1.613 m (5' 3.5\")   Wt 96.9 kg (213 lb 9.6 oz)   SpO2 100%   BMI 37.24 kg/m    /85 (BP Location: Left arm, Patient Position: Chair, Cuff Size: Adult Large)   Pulse 58   Ht 1.613 m (5' 3.5\")   Wt 96.9 kg (213 lb 9.6 oz)   SpO2 100%   BMI 37.24 kg/m    Body mass index is 37.24 kg/m .  Physical Exam   GENERAL: alert and no distress  EYES: Eyes grossly normal to inspection.  No discharge or erythema, or obvious scleral/conjunctival abnormalities.  RESP: No audible wheeze, cough, or visible cyanosis.    SKIN: Visible skin clear. No significant rash, abnormal pigmentation or lesions.  NEURO: Cranial nerves grossly intact.  Mentation and speech appropriate for age.  PSYCH: Appropriate affect, tone, and pace of words     Anti-obesity medication ROS:    HEENT  Hx of glaucoma: No    Cardiovascular  CAD:No  HTN:Yes      Gastrointestinal  GERD:No  Constipation: occasionally, doesn't take anything for it   Liver Dz:No  H/O Pancreatitis:Yes see hpi     Psychiatric  Bipolar: No  Anxiety:No  Depression:No  History of alcohol/drug abuse: No  Hx of eating disorder:No    Endocrine  Personal or family hx of MTC or MEN2:No  Diabetes/prediabetes: Yes prediabetes     Neurologic:  Hx of seizures: No  Hx of migraines: No  Memory Impairment: No  CVA history: No      History of kidney stones: No  Kidney disease: No  Current birth control: No no period for the last 2 years   Interested in future pregnancy: No    Taking Opioid/Narcotic: No        Sincerely,    Mindy Morgan PA-C     The longitudinal plan of care for the diagnosis(es)/condition(s) as documented were addressed during this visit. Due to the added complexity in care, I will continue to support Liya in the subsequent management and with ongoing continuity of care.       Again, thank you for allowing me to participate in the care of your patient.      Sincerely,    Mindy Morgan PA-C    "

## 2024-09-25 ENCOUNTER — TELEPHONE (OUTPATIENT)
Dept: ENDOCRINOLOGY | Facility: CLINIC | Age: 49
End: 2024-09-25
Payer: COMMERCIAL

## 2024-09-25 NOTE — TELEPHONE ENCOUNTER
Patient confirmed scheduled appointment:  Date: 10/9/24  Time: 10:30 am  Visit type: NEW [facility]  Provider: Suzanne Perales  Location: virtual  Testing/imaging: n/a  Additional notes: n/a

## 2024-09-25 NOTE — TELEPHONE ENCOUNTER
Approved via ePA - verified with pharmacy, they have a paid claim. Copay was $80, will let pt know there is a manufacture discount avail.

## 2024-10-06 ENCOUNTER — HEALTH MAINTENANCE LETTER (OUTPATIENT)
Age: 49
End: 2024-10-06

## 2024-10-08 NOTE — PROGRESS NOTES
"Video-Visit Details    Type of service:  Video Visit, Yakut speaking      Video Start Time: 10:36 AM   Video End Time: 11:01 AM    Originating Location (pt. Location): Other -parked car    Distant Location (provider location):  Offsite (providers home) [facility] [place]     Platform used for Video Visit: [employer]    New Weight Management Nutrition Consultation    Liya Moore is a 49 year old female presents today for new weight management nutrition consultation.  Patient referred by MAURICIO Root on 2024.    Patient with Co-morbidities of obesity includin/23/2024    10:12 AM   --   I have the following health issues associated with obesity Pre-Diabetes    High Blood Pressure    High Cholesterol    Fatty Liver    Hypothyroidism   I have the following symptoms associated with obesity Knee Pain    Depression    Lower Extremity Swelling    Fatigue     Anthropometrics:  Initial consult weight: 213 lb on 24  Estimated body mass index is 36.62 kg/m  as calculated from the following:    Height as of 24: 1.613 m (5' 3.5\").    Weight as of this encounter: 95.3 kg (210 lb).  Current Weight: 210 lb per patient report     Medications for Weight Loss:  Qsymia - during the first week patient experienced nausea. Also noticing it has been helpful at suppressing her appetite and food noise.     NUTRITION HISTORY  Food allergies: NKFA  Food intolerances: None   Vitamin/Mineral Supplements: Vitamin D  Previous methods of diet modification for weight loss: high protein, lots of vegetables, less sugar. Herbalife   RD before: No    Goals discussed with MAURICIO Root:   Prioritizing protein- 90g protein daily   Working towards 8 hours of sleep if able    Typically eats 2 meals a day. Craves sweets, though tries to avoid them. Is able to get full. Can stay full until next meal. Does struggle with portion control. Does experience food " noise. Does not experience emotional eating. Does not a loss of control around eating.     Works from 3:30 PM-12 AM    Diet recall:   1 PM - salad with 2 eggs OR chicken OR oats   7 or 8 PM, salad with chicken or rice or beans + sometimes a tortilla.   No other meals   Hydration:  Drinks black coffee with stevia, sometimes coconut milk, water, 1-2 sodas a week (sprite) but has recently stopped with the medication. No juice. Alcohol once a month, 1 kaya in a sitting.         9/23/2024    10:12 AM   Diet Recall Review with Patient   If you do eat breakfast, what types of food do you eat? coffee eggs vegetables   If you do eat lunch, what types of food do you typically eat? tortillas salads protein   If you do eat supper, what types of food do you typically eat? tortillas meat protein   If you do snack, what types of food do you typically eat? nuts and fruits/seeds   How many glasses of juice do you drink in a typical day? 0   How many of glasses of milk do you drink in a typical day? 1   If you do drink milk, what type? Skim   How many 8oz glasses of sugar containing drinks such as Gino-Aid/sweet tea do you drink in a day? 0   How many cans/bottles of sugar pop/soda/tea/sports drinks do you drink in a day? 1   How many cans/bottles of diet pop/soda/tea or sports drink do you drink in a day? 0   How often do you have a drink of alcohol? Never           9/23/2024    10:12 AM   Eating Habits   Generally, my meals include foods like these bread, pasta, rice, potatoes, corn, crackers, sweet dessert, pop, or juice A Few Times a Week   Eat at a buffet or sit-down restaurant Once a Week   Eat most of my meals in front of the TV or computer A Few Times a Week   Often skip meals, eat at random times, have no regular eating times Less Than Weekly   Rarely sit down for a meal but snack or graze throughout Once a Week   Eat extra snacks between meals Once a Week   Eat most of my food at the end of the day Almost Everyday    Eat in the middle of the night or wake up at night to eat Never   Eat extra snacks to prevent or correct low blood sugar A Few Times a Week   Eat to prevent acid reflux or stomach pain Never   Worry about not having enough food to eat Never   I eat when I am depressed A Few Times a Week   I eat when I am stressed A Few Times a Week   I eat when I am bored A Few Times a Week   I eat when I am anxious A Few Times a Week   I eat when I am happy or as a reward A Few Times a Week   I feel hungry all the time even if I just have eaten Almost Everyday   Feeling full is important to me Everyday   I finish all the food on my plate even if I am already full Everyday   I can't resist eating delicious food or walk past the good food/smell A Few Times a Week   I eat/snack without noticing that I am eating A Few Times a Week   I eat when I am preparing the meal Everyday   I eat more than usual when I see others eating Once a Week   I have trouble not eating sweets, ice cream, cookies, or chips if they are around the house Once a Week   I think about food all day Almost Everyday   What foods, if any, do you crave? Chips/Crackers   Please list any other foods you crave? bread           9/23/2024    10:12 AM   Amount of Food   I feel out of control when eating Weekly   I eat a large amount of food, like a loaf of bread, a box of cookies, a pint/quart of ice cream, all at once Weekly   I eat a large amount of food even when I am not hungry Weekly   I eat rapidly Monthly   I eat alone because I feel embarrassed and do not want others to see how much I have eaten Never   I eat until I am uncomfortably full Weekly   I feel bad, disgusted, or guilty after I overeat Weekly       Physical Activity:  works in cleaning, very active, 8 hours 5 days a week. Outside of work goes walking 40 minutes a day, 4-5 times a week.         9/23/2024    10:12 AM   Activity/Exercise History   How much of a typical 12 hour day do you spend sitting? Less  Than Half the Day   How much of a typical 12 hour day do you spend lying down? Less Than Half the Day   How much of a typical day do you spend walking/standing? Most of the Day   How many hours (not including work) do you spend on the TV/Video Games/Computer/Tablet/Phone? 4-5 Hours   How many times a week are you active for the purpose of exercise? Once a Week   What keeps you from being more active? Lack of Time    Too tired   How many total minutes do you spend doing some activity for the purpose of exercising when you exercise? 15-30 Minutes       Nutrition Prescription  Recommended energy/nutrient modification.    Nutrition Diagnosis  Obesity r/t long history of positive energy balance aeb BMI >30.    Nutrition Intervention  Materials/education provided on hypocaloric diet for weight loss. Discussed the benefits of a balanced diet rich in protein is ideal for promoting weight loss. Incorporating an abundance of lean protein sources into your diet will help you stay full for longer, reduce your appetite, help support muscle building, help you burn more calories at rest, and most importantly help prevent muscle loss.  Volumetric eating to help satiety level on fewer calories; portion control and healthy food choices (Plate Method and Volumetrics handouts). Patient demonstrates understanding. Co-developed goals to work towards.   Provided pt with list of goals and resources below via [employer].     Expected Engagement: good    Nutrition Goals  1) Aim to consume 60-90 grams of protein daily.   2) Eat balanced meals that consist of a lean protein, non-starchy vegetables and a smaller serving of carbohydrate food.  3) Drink at least 64 oz of water daily.     The Plate Method  [email or web address]    Protein Sources   [email or web address]     Quick/Easy Protein Sources:  Hard boiled eggs  Part-skim cheese sticks  Baby Bell cheese rounds  Low-fat/low-sugar Greek yogurt  Low-fat cottage cheese  Lean deli  "meat (chicken/turkey/ham)  Roasted chickpeas or lentils  Nuts   Turkey meat stick  Protein shake/bar  \"P3\" snack (cheese, nuts, deli meat)  Aldi's \"Protein Bread\"   \"Egglife\" egg white wrap    Tuna/salmon can/packet     Carbohydrates  [email or web address]     Mindful Eating  [email or web address]     Summary of Volumetrics Eating Plan  [email or web address]     Follow-Up: 5 weeks     Time spent with patient: 25 minutes.  Suzanne Perales, KEV, LD    "

## 2024-10-09 ENCOUNTER — VIRTUAL VISIT (OUTPATIENT)
Dept: ENDOCRINOLOGY | Facility: CLINIC | Age: 49
End: 2024-10-09
Payer: COMMERCIAL

## 2024-10-09 VITALS — HEIGHT: 64 IN | WEIGHT: 210 LBS | BODY MASS INDEX: 35.85 KG/M2

## 2024-10-09 DIAGNOSIS — E66.01 CLASS 2 SEVERE OBESITY WITH SERIOUS COMORBIDITY AND BODY MASS INDEX (BMI) OF 37.0 TO 37.9 IN ADULT, UNSPECIFIED OBESITY TYPE (H): ICD-10-CM

## 2024-10-09 DIAGNOSIS — E66.812 CLASS 2 SEVERE OBESITY WITH SERIOUS COMORBIDITY AND BODY MASS INDEX (BMI) OF 37.0 TO 37.9 IN ADULT, UNSPECIFIED OBESITY TYPE (H): ICD-10-CM

## 2024-10-09 DIAGNOSIS — Z71.3 NUTRITIONAL COUNSELING: Primary | ICD-10-CM

## 2024-10-09 PROCEDURE — 99207 PR NO CHARGE LOS: CPT | Mod: 95

## 2024-10-09 PROCEDURE — 97802 MEDICAL NUTRITION INDIV IN: CPT | Mod: 95

## 2024-10-09 NOTE — PATIENT INSTRUCTIONS
"Devante Nickerson,     It was nice to meet you today!    Follow-up with RD in 4-5 weeks.     Thank you,    Suzanne Perales RD, LD  If you would like to schedule or reschedule an appointment with the RD, please call [phone]    Nutrition Goals  1) Aim to consume 60-90 grams of protein daily.   2) Eat balanced meals that consist of a lean protein, non-starchy vegetables and a smaller serving of carbohydrate food.  3) Drink at least 64 oz of water daily.     The Plate Method  [email or web address]    Protein Sources   [email or web address]     Quick/Easy Protein Sources:  Hard boiled eggs  Part-skim cheese sticks  Baby Bell cheese rounds  Low-fat/low-sugar Greek yogurt  Low-fat cottage cheese  Lean deli meat (chicken/turkey/ham)  Roasted chickpeas or lentils  Nuts   Turkey meat stick  Protein shake/bar  \"P3\" snack (cheese, nuts, deli meat)  Aldi's \"Protein Bread\"   \"Egglife\" egg white wrap    Tuna/salmon can/packet     Carbohydrates  [email or web address]     Mindful Eating  [email or web address]     Summary of Volumetrics Eating Plan  [email or web address]     COMPREHENSIVE WEIGHT MANAGEMENT PROGRAM  VIRTUAL SUPPORT GROUPS    At [facility], our Comprehensive Weight Management program offers on-line support groups for patients who are working on weight loss and considering, preparing for, or have had weight loss surgery.     There is no cost for this opportunity.  You are invited to attend the?Virtual Support Groups?provided by any of the following locations:    [facility] via [employer] Teams with Catherine Pina RD, RN  2.   [place] via [employer] Teams with Eliot Moore, PhD, LP  3.   [place] via [employer] Teams with Priyanka Mendoza RN  4.   [facility] via a Zoom Meeting with BELL Coates-    The following Support Group information can also be found on our " "website:  [email or web address]      [facility]   WEIGHT LOSS SURGERY SUPPORT GROUP  The support group is a patient-lead forum that meets monthly to share experiences, encouragement and education. It is open to those who have had weight loss surgery, are scheduled for surgery, or are considering surgery.   WHEN: 3rd Wednesday of each month from 5:00PM - 6:00PM using Microsoft Teams.   FACILITATOR: Led by Catherine Kaur RD, LD, RN, the program's Clinical Coordinator.   TO REGISTER: Please contact the clinic via [employer] or call the nurse line directly at [phone] to inform our staff that you would like an invite sent to you and to let us know the email you would like the invite sent to. Prior to the meeting, a link with directions on how to join the meeting will be sent to you.    2024 Meetings September 18: Yu Ross, PhD, Outpatient Clinical Therapist, \"Pro Tips for Habit Change\".  October 16:  Let's Talk  This will be a time of group support.??   November 20:  Let's Talk  about How to Navigate the Upcoming Holiday Season.  December 18:  Let's Talk  and Celebrate the past year.       [facility] BARIATRIC CARE SUPPORT GROUP  This is open to all pre- and post- operative bariatric surgery patients as well as their support system.   WHEN: 3rd Tuesday of each month from 6:30 PM - 8:00 PM using Microsoft Teams.   FACILITATOR: Led by Eliot Moore, Ph.D who is a Licensed Psychologist with the [facility] Comprehensive Weight Management Program.   TO REGISTER: Please send an email to Eliot Moore, Ph.D., LP [email or web address]?if you would like an invitation to the group. Prior to the meeting, a link with directions on how to join the meeting will be sent to you.    2024 Meetings September 17: IN PERSON MEETING. [facility], [address] [place] [address] " "[place], [place], [address], 1st floor Conference Room.  October 15: Date changed to OCTOBER 8th (2nd Tuesday).   November 19: \"Holiday Eating\", Jenae Caro RD, LD.  December 17: \"Hospital Stay and Compliance\", Priyanka Mendoza RN, CBN.      [facility] POST-OPERATIVE BARIATRIC SURGERY SUPPORT GROUP  This is a support group for [facility] bariatric patients (and those external to [facility]) who have had bariatric surgery and are at least 3 months post-surgery.  WHEN: 4th Thursday of the month from 11:00 AM - 12:00 PM using Microsoft Teams.   FACILITATOR: Led by Certified Bariatric Nurse, Priyanka Mendoza RN.   TO REGISTER: Please send an email to Priyanka at [email or web address] if you would like an invitation to the group.  Prior to the meeting, a link with directions on how to join the meeting will be sent to you.    2024 Meetings  September 26 October 24 November 28: No meeting  December 26    [facility]   HEALTHY LIFESTYLE COACHING GROUP  This is a 60 minute virtual coaching group for those who want to lead a healthier lifestyle. Come together to set goals and overcome barriers in a supportive group environment. We will address the four pillars of health: nutrition, exercise, sleep, and emotional well-being. This group is highly recommended for those who are participating in the 24 week Healthy Lifestyle Plan and our Health Coaching sessions.   WHEN: 1st Friday of the month, 12:30 PM - 1:30 PM   using a Zoom meeting.     FACILITATOR: Led by National Board-Certified Health and , Priyanka Bansal, [facility].  TO REGISTER: Please call the [email or web address] at [phone] to register. You will get an appointment to attend in [employer]. Fifteen minutes prior to the meeting, complete the e-check in and you will get the link to join the meeting.  There is no charge to attend this group and " space is limited.  Please register for each month you wish to attend    2024 Meetings  September 5 No meeting.  October 4 November 1 December 6

## 2024-10-09 NOTE — NURSING NOTE
Current patient location: [address] [place] [address] [address]  [facility] [address]  Picking up medications and maybe will be home.  Is the patient currently in the state of [place]? YES    Visit mode:VIDEO    If the visit is dropped, the patient can be reconnected by: VIDEO VISIT: Text to cell phone:   Telephone Information:   Mobile [phone]       Will anyone else be joining the visit? NO  (If patient encounters technical issues they should call [phone])    How would you like to obtain your AVS? MyChart    Are changes needed to the allergy or medication list? Pt stated no changes to allergies and Pt stated no med changes    Are refills needed on medications prescribed by this physician? NO    Reason for visit: Consult    Chantal WHITE

## 2024-10-09 NOTE — LETTER
"10/9/2024       RE: Liya Moore  [address]  [place] [address]     Dear Colleague,    Thank you for referring your patient, Liya Moore, to the [facility] [place] at [facility]. Please see a copy of my visit note below.    Video-Visit Details    Type of service:  Video Visit, Panamanian speaking      Video Start Time: 10:36 AM   Video End Time: 11:01 AM    Originating Location (pt. Location): Other -parked car    Distant Location (provider location):  Offsite (providers home) [facility] [place]     Platform used for Video Visit: [employer]    New Weight Management Nutrition Consultation    Liya Moore is a 49 year old female presents today for new weight management nutrition consultation.  Patient referred by MAURICIO Root on 2024.    Patient with Co-morbidities of obesity includin/23/2024    10:12 AM   --   I have the following health issues associated with obesity Pre-Diabetes    High Blood Pressure    High Cholesterol    Fatty Liver    Hypothyroidism   I have the following symptoms associated with obesity Knee Pain    Depression    Lower Extremity Swelling    Fatigue     Anthropometrics:  Initial consult weight: 213 lb on 24  Estimated body mass index is 36.62 kg/m  as calculated from the following:    Height as of 24: 1.613 m (5' 3.5\").    Weight as of this encounter: 95.3 kg (210 lb).  Current Weight: 210 lb per patient report     Medications for Weight Loss:  Qsymia - during the first week patient experienced nausea. Also noticing it has been helpful at suppressing her appetite and food noise.     NUTRITION HISTORY  Food allergies: NKFA  Food intolerances: None   Vitamin/Mineral Supplements: Vitamin D  Previous methods of diet modification for weight loss: high protein, lots of vegetables, less sugar. " Herbalife   RD before: No    Goals discussed with MAURICIO Root:   Prioritizing protein- 90g protein daily   Working towards 8 hours of sleep if able    Typically eats 2 meals a day. Craves sweets, though tries to avoid them. Is able to get full. Can stay full until next meal. Does struggle with portion control. Does experience food noise. Does not experience emotional eating. Does not a loss of control around eating.     Works from 3:30 PM-12 AM    Diet recall:   1 PM - salad with 2 eggs OR chicken OR oats   7 or 8 PM, salad with chicken or rice or beans + sometimes a tortilla.   No other meals   Hydration:  Drinks black coffee with stevia, sometimes coconut milk, water, 1-2 sodas a week (sprite) but has recently stopped with the medication. No juice. Alcohol once a month, 1 kaya in a sitting.         9/23/2024    10:12 AM   Diet Recall Review with Patient   If you do eat breakfast, what types of food do you eat? coffee eggs vegetables   If you do eat lunch, what types of food do you typically eat? tortillas salads protein   If you do eat supper, what types of food do you typically eat? tortillas meat protein   If you do snack, what types of food do you typically eat? nuts and fruits/seeds   How many glasses of juice do you drink in a typical day? 0   How many of glasses of milk do you drink in a typical day? 1   If you do drink milk, what type? Skim   How many 8oz glasses of sugar containing drinks such as Gino-Aid/sweet tea do you drink in a day? 0   How many cans/bottles of sugar pop/soda/tea/sports drinks do you drink in a day? 1   How many cans/bottles of diet pop/soda/tea or sports drink do you drink in a day? 0   How often do you have a drink of alcohol? Never           9/23/2024    10:12 AM   Eating Habits   Generally, my meals include foods like these bread, pasta, rice, potatoes, corn, crackers, sweet dessert, pop, or juice A Few Times a Week   Eat at a buffet or sit-down restaurant Once a Week    Eat most of my meals in front of the TV or computer A Few Times a Week   Often skip meals, eat at random times, have no regular eating times Less Than Weekly   Rarely sit down for a meal but snack or graze throughout Once a Week   Eat extra snacks between meals Once a Week   Eat most of my food at the end of the day Almost Everyday   Eat in the middle of the night or wake up at night to eat Never   Eat extra snacks to prevent or correct low blood sugar A Few Times a Week   Eat to prevent acid reflux or stomach pain Never   Worry about not having enough food to eat Never   I eat when I am depressed A Few Times a Week   I eat when I am stressed A Few Times a Week   I eat when I am bored A Few Times a Week   I eat when I am anxious A Few Times a Week   I eat when I am happy or as a reward A Few Times a Week   I feel hungry all the time even if I just have eaten Almost Everyday   Feeling full is important to me Everyday   I finish all the food on my plate even if I am already full Everyday   I can't resist eating delicious food or walk past the good food/smell A Few Times a Week   I eat/snack without noticing that I am eating A Few Times a Week   I eat when I am preparing the meal Everyday   I eat more than usual when I see others eating Once a Week   I have trouble not eating sweets, ice cream, cookies, or chips if they are around the house Once a Week   I think about food all day Almost Everyday   What foods, if any, do you crave? Chips/Crackers   Please list any other foods you crave? bread           9/23/2024    10:12 AM   Amount of Food   I feel out of control when eating Weekly   I eat a large amount of food, like a loaf of bread, a box of cookies, a pint/quart of ice cream, all at once Weekly   I eat a large amount of food even when I am not hungry Weekly   I eat rapidly Monthly   I eat alone because I feel embarrassed and do not want others to see how much I have eaten Never   I eat until I am uncomfortably  full Weekly   I feel bad, disgusted, or guilty after I overeat Weekly       Physical Activity:  works in cleaning, very active, 8 hours 5 days a week. Outside of work goes walking 40 minutes a day, 4-5 times a week.         9/23/2024    10:12 AM   Activity/Exercise History   How much of a typical 12 hour day do you spend sitting? Less Than Half the Day   How much of a typical 12 hour day do you spend lying down? Less Than Half the Day   How much of a typical day do you spend walking/standing? Most of the Day   How many hours (not including work) do you spend on the TV/Video Games/Computer/Tablet/Phone? 4-5 Hours   How many times a week are you active for the purpose of exercise? Once a Week   What keeps you from being more active? Lack of Time    Too tired   How many total minutes do you spend doing some activity for the purpose of exercising when you exercise? 15-30 Minutes       Nutrition Prescription  Recommended energy/nutrient modification.    Nutrition Diagnosis  Obesity r/t long history of positive energy balance aeb BMI >30.    Nutrition Intervention  Materials/education provided on hypocaloric diet for weight loss. Discussed the benefits of a balanced diet rich in protein is ideal for promoting weight loss. Incorporating an abundance of lean protein sources into your diet will help you stay full for longer, reduce your appetite, help support muscle building, help you burn more calories at rest, and most importantly help prevent muscle loss.  Volumetric eating to help satiety level on fewer calories; portion control and healthy food choices (Plate Method and Volumetrics handouts). Patient demonstrates understanding. Co-developed goals to work towards.   Provided pt with list of goals and resources below via [employer].     Expected Engagement: good    Nutrition Goals  1) Aim to consume 60-90 grams of protein daily.   2) Eat balanced meals that consist of a lean protein, non-starchy vegetables and a smaller  "serving of carbohydrate food.  3) Drink at least 64 oz of water daily.     The Plate Method  [email or web address]    Protein Sources   [email or web address]     Quick/Easy Protein Sources:  Hard boiled eggs  Part-skim cheese sticks  Baby Bell cheese rounds  Low-fat/low-sugar Greek yogurt  Low-fat cottage cheese  Lean deli meat (chicken/turkey/ham)  Roasted chickpeas or lentils  Nuts   Turkey meat stick  Protein shake/bar  \"P3\" snack (cheese, nuts, deli meat)  Aldi's \"Protein Bread\"   \"Egglife\" egg white wrap    Tuna/salmon can/packet     Carbohydrates  [email or web address]     Mindful Eating  [email or web address]     Summary of Volumetrics Eating Plan  [email or web address]     Follow-Up: 5 weeks     Time spent with patient: 25 minutes.  Suzanne Perales RD, LD      Again, thank you for allowing me to participate in the care of your patient.      Sincerely,    Suzanne Perales RD    "

## 2024-11-07 ENCOUNTER — TELEPHONE (OUTPATIENT)
Dept: ENDOCRINOLOGY | Facility: CLINIC | Age: 49
End: 2024-11-07
Payer: COMMERCIAL

## 2024-11-07 NOTE — TELEPHONE ENCOUNTER
Sent [facility] (1st Attempt) and Patient Contacted for the patient to call back and schedule the following:    Appointment type: Return Med WT [facility] Nutrition  Appointment mode: Virtual Visit  Provider: Suzanne Perales  Return date: Approx. 11/13/24  Specialty phone number: [phone]    Additional Notes: Patient declined to schedule at this time. She will follow up when she is ready to schedule another appointment.

## 2024-12-17 DIAGNOSIS — E66.01 CLASS 2 SEVERE OBESITY WITH SERIOUS COMORBIDITY AND BODY MASS INDEX (BMI) OF 37.0 TO 37.9 IN ADULT, UNSPECIFIED OBESITY TYPE (H): ICD-10-CM

## 2024-12-17 DIAGNOSIS — E78.5 HYPERLIPIDEMIA, UNSPECIFIED HYPERLIPIDEMIA TYPE: ICD-10-CM

## 2024-12-17 DIAGNOSIS — E66.812 CLASS 2 SEVERE OBESITY WITH SERIOUS COMORBIDITY AND BODY MASS INDEX (BMI) OF 37.0 TO 37.9 IN ADULT, UNSPECIFIED OBESITY TYPE (H): ICD-10-CM

## 2024-12-17 NOTE — TELEPHONE ENCOUNTER
Medication Requested:  Phentermine-Topiramate 7.5-46 MG CP24 30 capsule 1 10/14/2024 -- No   Sig - Route: Take 1 tablet by mouth daily. After completing 30 days of lower dose - Oral     ----------------------  Last Office Visit : 9/23/2024  [facility] Office visit:     1/3/2025 12:30 PM (30 min)  Jimenez   Arrive by: 12:15 PM   RETURN MEDICAL WEIGHT MGMT   [facility] ([facility])   Mindy Morgan PA-C     ----------------------        Refill decision: Refill pended and routed to the provider for review/determination due to the following criteria not met:       -Controlled Medication Request

## 2024-12-17 NOTE — TELEPHONE ENCOUNTER
Medication Question or Refill    Contacts       Contact Date/Time Type Contact Phone/Fax    12/17/2024 11:17 AM CST Phone (Incoming) Liya Gonzalez (Self) [phone] (M)            What medication are you calling about (include dose and sig)?: Qysmia 7.5 mg    Preferred Pharmacy:   [place] [address] [place] [address] [address]  Phone: [phone] Fax: [phone]      Controlled Substance Agreement on file:   CSA -- Patient Level:    CSA: None found at the patient level.       Who prescribed the medication?: josette Morgan    Do you need a refill? Yes    When did you use the medication last? 12/16/2024    Patient offered an appointment? Yes: Pt. Scheduled a follow up with Josette.    Do you have any questions or concerns?  No      Could we send this information to you in [employer] or would you prefer to receive a phone call?:   Patient would like to be contacted via [employer]

## 2024-12-17 NOTE — TELEPHONE ENCOUNTER
Appointment with Mindy Morgan PA-C on 1/3/25. Routed to RONDA for sign off.     Plan  Start qsymia 3.75-23mg daily for 30 days, after that you can increase to 7.5-46mg going forward  Goals we discussed today:   Prioritizing protein- 90g protein daily   Working towards 8 hours of sleep if able  Labs ordered today: Pth, vitamin d, cmp,  A1c   Follow up with Mindy in 3 months   Dietician appointment scheduled 9/26/24

## 2025-01-31 DIAGNOSIS — E66.01 CLASS 2 SEVERE OBESITY WITH SERIOUS COMORBIDITY AND BODY MASS INDEX (BMI) OF 37.0 TO 37.9 IN ADULT, UNSPECIFIED OBESITY TYPE (H): ICD-10-CM

## 2025-01-31 DIAGNOSIS — R73.03 PREDIABETES: ICD-10-CM

## 2025-01-31 DIAGNOSIS — E66.812 CLASS 2 SEVERE OBESITY WITH SERIOUS COMORBIDITY AND BODY MASS INDEX (BMI) OF 37.0 TO 37.9 IN ADULT, UNSPECIFIED OBESITY TYPE (H): ICD-10-CM

## 2025-01-31 NOTE — TELEPHONE ENCOUNTER
Phentermine-Topiramate 11.25-69 MG CP24   Last Written Prescription:     Phentermine-Topiramate 11.25-69 MG CP24 30 capsule 3 1/3/2025 -- No   Sig - Route: Take 1 tablet by mouth daily. - Oral          Refill decision: Medication unable to be refilled by RN due to:        [x]    Controlled medication recently( 1/3/25) sent to  [facility] [id number] [place] [facility] - [address]   Requesting to alternate pharmacy, new script required.: [id number] [address] AT [place] [address]         Request from pharmacy:  Requested Prescriptions   Pending Prescriptions Disp Refills    Phentermine-Topiramate 11.25-69 MG CP24 30 capsule 3     Sig: Take 1 tablet by mouth daily.       There is no refill protocol information for this order

## 2025-01-31 NOTE — TELEPHONE ENCOUNTER
Medication Question or Refill    Contacts       Contact Date/Time Type Contact Phone/Fax    01/31/2025 02:44 PM CST Phone (Incoming) Liya Gonzalez (Self) [phone] (M)            What medication are you calling about (include dose and sig)?:     Phentermine-Topiramate 11.25-69 MG CP24     Preferred Pharmacy    Please change to:   [facility], [address]    Controlled Substance Agreement on file:   CSA -- Patient Level:    CSA: None found at the patient level.       Who prescribed the medication?: Mindy    Please belen escoto done, and hoping for today.    Could we send this information to you in [employer] or would you prefer to receive a phone call?:   Patient would like to be contacted via [employer]

## 2025-04-17 ENCOUNTER — MYC MEDICAL ADVICE (OUTPATIENT)
Dept: ENDOCRINOLOGY | Facility: CLINIC | Age: 50
End: 2025-04-17
Payer: COMMERCIAL

## 2025-04-17 DIAGNOSIS — R73.03 PREDIABETES: ICD-10-CM

## 2025-04-17 DIAGNOSIS — E66.812 CLASS 2 SEVERE OBESITY WITH SERIOUS COMORBIDITY AND BODY MASS INDEX (BMI) OF 37.0 TO 37.9 IN ADULT, UNSPECIFIED OBESITY TYPE (H): ICD-10-CM

## 2025-04-17 DIAGNOSIS — E66.01 CLASS 2 SEVERE OBESITY WITH SERIOUS COMORBIDITY AND BODY MASS INDEX (BMI) OF 37.0 TO 37.9 IN ADULT, UNSPECIFIED OBESITY TYPE (H): ICD-10-CM

## 2025-04-17 DIAGNOSIS — E78.5 HYPERLIPIDEMIA, UNSPECIFIED HYPERLIPIDEMIA TYPE: ICD-10-CM

## 2025-04-17 NOTE — TELEPHONE ENCOUNTER
Medication Question or Refill    Contacts       Contact Date/Time Type Contact Phone/Fax    04/17/2025 10:54 AM CDT Phone (Incoming) Liya Gonzalez (Self) [phone] (M)            What medication are you calling about (include dose and sig)?:     Phentermine-Topiramate 11.25-69 MG CP24     Preferred Pharmacy:  [place] [address] [place] [facility] [address] [facility]  [address] [facility] [address]  Phone: [phone] Fax: [phone]          Controlled Substance Agreement on file:   CSA -- Patient Level:    CSA: None found at the patient level.       Who prescribed the medication?: Eddie     Do you need a refill? Yes    When did you use the medication last? Yesterday, has one pill left    Patient offered an appointment? Yes: has onbooks    Do you have any questions or concerns?  Yes:     Please call.. pharm says not filled due to insurance issue.  ALSO.. change to above pharm      Could we send this information to you in [facility] or would you prefer to receive a phone call?:   Patient would prefer a phone call   Okay to leave a detailed message?: Yes at Cell number on file:    Telephone Information:   Mobile [phone]

## 2025-04-17 NOTE — TELEPHONE ENCOUNTER
[employer] message sent to patient to verify which pharmacy she is requesting this rx be filled at.

## 2025-04-17 NOTE — TELEPHONE ENCOUNTER
Last Written Prescription:    Phentermine-Topiramate 11.25-69 MG CP24   30 capsule 3 2/3/2025 -- No  Sig - Route: Take 1 tablet by mouth daily. - Oral    ----------------------  Last Visit Date: 1-3-25  Future Visit Date: 5-29-25  -    Refill decision: Medication unable to be refilled by RN due to:  Controlled medication  Pt pt call- pt request -Pharmacy change    Preferred Pharmacy:  [facility] [address] [place] [facility] - [address] [address]  Phone: [phone] Fax: [phone]  Request from pharmacy:  Requested Prescriptions

## 2025-04-21 ENCOUNTER — TELEPHONE (OUTPATIENT)
Dept: ENDOCRINOLOGY | Facility: CLINIC | Age: 50
End: 2025-04-21
Payer: COMMERCIAL

## 2025-04-21 NOTE — TELEPHONE ENCOUNTER
"Prior Authorization Retail Medication Request    Medication/Dose: Qsymia  Diagnosis and ICD code (if different than what is on RX):  Class 2 severe obesity with serious comorbidity and body mass index (BMI) of 37.0 to 37.9 in adult, unspecified obesity type (H) [E66.812, E66.01, Z68.37]     New/renewal/insurance change PA/secondary ins. PA:  Previously Tried and Failed:  lower dose of Qsymia, history of diet and exercise  Rationale:  I had the pleasure of seeing your patient Liya Moore. She is a 49 year old female who I am continuing to see for treatment of obesity related to: Pre-Diabetes, High Blood Pressure, High Cholesterol, Fatty Liver, Hypothyroidism.     Weight loss medication(s) are indicated due to patient having a BMI of at least 30 kg/m2..    Estimated body mass index is 34.91 kg/m  as calculated from the following:    Height as of 1/3/25: 1.613 m (5' 3.5\").    Weight as of 1/3/25: 90.8 kg (200 lb 3.2 oz).         Insurance   Primary:   Insurance ID:      Secondary (if applicable):  Insurance ID:      Pharmacy Information (if different than what is on RX)  [facility] [place]  Phone:    Fax:    Clinic Information  Preferred routing pool for dept communication: S nurse   "

## 2025-04-25 NOTE — TELEPHONE ENCOUNTER
Prior Authorization Not Needed per Insurance    Medication: QSYMIA 11.25-69 MG PO CP24  Insurance Company: [employer] Non-Specialty PA's - Phone [phone] Fax [phone]  Expected CoPay: $    [facility] [id number] [place] [address]  Pharmacy Notified: YES  Approved   4/21/2025  1:03 PM  Note from payer: An active PA is already on file with expiration date of 04/21/2026. Please wait to resubmit request within 60 days of that expiration date to obtain a PA renewal.   Payer: [employer]    [phone]    Pharmacy has a paid claim.    Patient Notified: Instructed pharmacy to notify patient once order is ready.

## 2025-05-29 ENCOUNTER — VIRTUAL VISIT (OUTPATIENT)
Dept: ENDOCRINOLOGY | Facility: CLINIC | Age: 50
End: 2025-05-29
Payer: COMMERCIAL

## 2025-05-29 VITALS — BODY MASS INDEX: 34.9 KG/M2 | HEIGHT: 64 IN

## 2025-05-29 DIAGNOSIS — R79.89 LOW VITAMIN D LEVEL: ICD-10-CM

## 2025-05-29 DIAGNOSIS — R73.03 PREDIABETES: ICD-10-CM

## 2025-05-29 DIAGNOSIS — E66.01 CLASS 2 SEVERE OBESITY WITH SERIOUS COMORBIDITY AND BODY MASS INDEX (BMI) OF 37.0 TO 37.9 IN ADULT, UNSPECIFIED OBESITY TYPE (H): Primary | ICD-10-CM

## 2025-05-29 DIAGNOSIS — E66.812 CLASS 2 SEVERE OBESITY WITH SERIOUS COMORBIDITY AND BODY MASS INDEX (BMI) OF 37.0 TO 37.9 IN ADULT, UNSPECIFIED OBESITY TYPE (H): Primary | ICD-10-CM

## 2025-05-29 NOTE — NURSING NOTE
Current patient location: [address] [place] [address]    Is the patient currently in the state of [place]? yes    Visit mode:VIDEO    If the visit is dropped, the patient can be reconnected by: VIDEO VISIT: Text to cell phone:   Telephone Information:   Mobile [phone]       Will anyone else be joining the visit? NO  (If patient encounters technical issues they should call [phone]    Are changes needed to the allergy or medication list? No    Are refills needed on medications prescribed by this physician? NO    Reason for visit: RECHECK    Chantal WHITE

## 2025-05-29 NOTE — PATIENT INSTRUCTIONS
Visit Plan:     Continue Qsymia 11.25/69mg in the morning. Refills sent   Start Vitamin D 5000IU once daily.   Mindy Morgan PA-C in 3 months

## 2025-05-29 NOTE — PROGRESS NOTES
Virtual Visit Details    Type of service:  Video Visit     Originating Location (pt. Location): Home    Distant Location (provider location):  On-site  Platform used for Video Visit: [facility] Weight Management Note     Liya Moore  MRN:  [id number]  :  1975  [place]:  2025    Dear Physician No Ref-Primary,    I had the pleasure of seeing your patient Liya Moore. She is a 49 year old female who I am continuing to see for treatment of obesity related to:        2024    10:12 AM   --   I have the following health issues associated with obesity Pre-Diabetes    High Blood Pressure    High Cholesterol    Fatty Liver    Hypothyroidism   I have the following symptoms associated with obesity Knee Pain    Depression    Lower Extremity Swelling    Fatigue       Assessment & Plan   Problem List Items Addressed This Visit    None  Visit Diagnoses         Class 2 severe obesity with serious comorbidity and body mass index (BMI) of 37.0 to 37.9 in adult, unspecified obesity type (H)    -  Primary    Relevant Medications    Phentermine-Topiramate 11.25-69 MG CP24      Prediabetes        Relevant Medications    Phentermine-Topiramate 11.25-69 MG CP24      Low vitamin D level        Relevant Medications    Cholecalciferol (VITAMIN D-3) 125 MCG (5000 UT) TABS           Continue Qsymia 11.25/69mg in the morning. Refills sent   Start Vitamin D 5000IU once daily.   Mindy Morgan PA-C in 3 months         INTERVAL HISTORY:  New MWM with Mindy Morgan PA-C on 24.   Overweight onset after pregnancies, then was diagnosed with  hypothyroidism which was associated with further weight gain.   Before pregnancies weighed 115-125 lbs. After pregnancies, saw rapid weight gain with diagnosis with the hypothyroidism, recalls 30-40lbs weight. Has since seen gradual weight gain, current weight of 213lbs is highest weight in life.   Started Qsymia      She has not weighed herself  in a while, but she does feel like she is losing weight. Clothes are fitting looser.     Anti-obesity medication history    Current:   Qsymia 11.25/69mg - with dose increase had side effects of nausea and blurry vision for the 1 weeks. It has resolved. Currently has no side effects. Dose increase has gone well overall. Does think it has been helpful with weight loss.     BP at home - 118/82.       Recent diet changes: Eating 3 meals a day. No snacks. Hunger is well controlled. Drinking 3-4L of water daily.   B - protein shake + papaya +/- fruit   L - eggs + vegetables   D - chicken or fish + rice + vegetables     Recent exercise/activity changes: Very active at her job. Will walk when able.     Recent stressors: No concerns    Recent sleep changes: No concerns    Vitamins/Labs: Vitamin D 2000IU   Reviewed labs -   A1C decrease 6.3 ->6.3.   Vitamin D increase 19-> 22     CURRENT WEIGHT:   0 lbs 0 oz - does not have an updated weight today.     Initial Weight (lbs): 213.6 lbs  Last Visits Weight: 90.8 kg (200 lb 3.2 oz)              5/29/2025    12:25 PM   Changes and Difficulties   I have made the following changes to my diet since my last visit: no   With regards to my diet, I am still struggling with: no   I have made the following changes to my activity/exercise since my last visit: no   With regards to my activity/exercise, I am still struggling with: no         MEDICATIONS:   Current Outpatient Medications   Medication Sig Dispense Refill    acetaminophen (TYLENOL) 325 MG tablet 2 tablets as needed Orally every 4 hrs      Cholecalciferol (VITAMIN D-3) 125 MCG (5000 UT) TABS Take 1 capsule by mouth daily. 30 tablet 11    levothyroxine (SYNTHROID/LEVOTHROID) 175 MCG tablet Take 175 mcg by mouth daily.      Phentermine-Topiramate 11.25-69 MG CP24 Take 1 tablet by mouth daily. 30 capsule 4    SUMAtriptan (IMITREX) 25 MG tablet       Vitamin D3 (CHOLECALCIFEROL) 25 mcg (1000 units) tablet Take 1,000 Units by mouth.       citalopram (CELEXA) 20 MG tablet 1 tablet Orally Once a day (Patient not taking: Reported on 5/29/2025)      labetalol (NORMODYNE) 100 MG tablet Take 100 mg by mouth. (Patient not taking: Reported on 5/29/2025)             5/29/2025    12:25 PM   Weight Loss Medication History Reviewed With Patient   Which weight loss medications are you currently taking on a regular basis? Qysmia (phentermine/topiramate)   Are you having any side effects from the weight loss medication that we have prescribed you? No       External Order Results on 02/25/2025   Component Date Value Ref Range Status    Cholesterol (External) 02/25/2025 229 (H)  <200 MG/DL Final    HDL Cholesterol (External) 02/25/2025 45 (L)  >=50 MG/DL Final    Triglycerides (External) 02/25/2025 307 (H)  <150 MG/DL Final    LDL-Cholesterol (External) 02/25/2025 140 (H)  <100 MG/DL Final    Protein Total (External) 02/25/2025 7.8  6.1 - 8.1 G/DL Final    Albumin (External) 02/25/2025 4.5  3.6 - 5.1 G/DL Final    Bilirubin Total (External) 02/25/2025 0.6  0.2 - 1.2 MG/DL Final    Alk Phosphatase (External) 02/25/2025 124  31 - 125 U/L Final    AST (External) 02/25/2025 17  10 - 35 U/L Final    ALT (External) 02/25/2025 20  6 - 29 U/L Final    Glucose (External) 02/25/2025 90  65 - 99 MG/DL Final    Urea Nitrogen (External) 02/25/2025 19  7 - 25 MG/DL Final    Creatinine (External) 02/25/2025 0.76  0.50 - 0.99 MG/DL Final    GFR Estimated (External) 02/25/2025 96  > or = 60 ML/MIN/1.73M2 Final    Sodium (External) 02/25/2025 139  135 - 146 MMOL/L Final    Potassium (External) 02/25/2025 4.5  3.5 - 5.3 MMOL/L Final    Chloride (External) 02/25/2025 107  98 - 110 MMOL/L Final    CO2 (External) 02/25/2025 23  20 - 32 MMOL/L Final    Calcium (External) 02/25/2025 9.6  8.6 - 10.2 MG/DL Final    Absolute Neutrophils (External) 02/25/2025 3,137  1,500 - 7,800 CELLS/UL Final    Absolute Lymphocytes (External) 02/25/2025 2,375  850 - 3,900 CELLS/UL Final    Absolute  "Monocytes (External) 02/25/2025 496  200 - 950 CELLS/UL Final    Absolute Eosinophils (External) 02/25/2025 143  15 - 500 CELLS/UL Final    Absolute Basophils (External) 02/25/2025 50  0 - 200 CELLS/UL Final    % Neutrophils (External) 02/25/2025 50.6  % Final    % Lymphocytes (External) 02/25/2025 38.3  % Final    % Monocytes (External) 02/25/2025 8.0  % Final    % Eosinophils (External) 02/25/2025 2.3  % Final    % Basophils (External) 02/25/2025 0.8  % Final    WBC Count (External) 02/25/2025 6.2  3.8 - 10.8 THOUSAND/UL Final    RBC Count (External) 02/25/2025 4.65  3.80 - 5.10 Million/uL Final    Hemoglobin (External) 02/25/2025 13.8  11.7 - 15.5 G/DL Final    Hematocrit (External) 02/25/2025 41.6  35.4 - 45.0 % Final    MCV (External) 02/25/2025 89.5  80.0 - 100.0 FL Final    MCH (External) 02/25/2025 29.7  27.0 - 33.0 PG Final    MCHC (External) 02/25/2025 33.2  32.0 - 36.0 G/DL Final    RDW (External) 02/25/2025 12.3  11.0 - 15.0 % Final    Platelet Count (External) 02/25/2025 312  140 - 400 THOUSAND/UL Final    Parathyroid Hormone Intact (Extern* 02/25/2025 55  16 - 77 PG/ML Final    TSH (External) 02/25/2025 0.39  See scan mIU/L Final    Thyroxine Free (External) 02/25/2025 1.6  0.8 - 1.8 ng/dL Final    Vitamin D Deficiency Screening (Ex* 02/25/2025 22 (L)  30 - 100 NG/Ml Final    Hemoglobin A1C (External) 02/25/2025 6.0 (H)  <5.7 % Final           Objective    Ht 1.613 m (5' 3.5\")   BMI 34.90 kg/m           Vitals:  No vitals were obtained today due to virtual visit.      PHYSICAL EXAM:  GENERAL: alert and no distress  EYES: Eyes grossly normal to inspection.  No discharge or erythema, or obvious scleral/conjunctival abnormalities.  RESP: No audible wheeze, cough, or visible cyanosis.    SKIN: Visible skin clear. No significant rash, abnormal pigmentation or lesions.  NEURO: Cranial nerves grossly intact.  Mentation and speech appropriate for age.  PSYCH: Appropriate affect, tone, and pace of " words        Sincerely,    ELIZABETH De LeonC      24 minutes spent by me on the date of the encounter doing chart review, history and exam, documentation and further activities per the note

## 2025-05-29 NOTE — PROGRESS NOTES
"Virtual Visit Details    Type of service:  Video Visit     Originating Location (pt. Location): {video visit patient location:085174::\"Home\"}  {PROVIDER LOCATION On-site should be selected for visits conducted from your clinic location or adjoining [facility] hospital, academic office, or other nearby [facility] building. Off-site should be selected for all other provider locations, including home:[address]}  Distant Location (provider location):  {virtual location provider:021253}  Platform used for Video Visit: {Virtual Visit Platforms:388143::\"VuMedi\"}        "

## 2025-05-29 NOTE — LETTER
2025       RE: Liya Moore  [address]  [place] [address]     Dear Colleague,    Thank you for referring your patient, Liya Moore, to the [facility] [place] at [facility]. Please see a copy of my visit note below.    Virtual Visit Details    Type of service:  Video Visit     Originating Location (pt. Location): Home    Distant Location (provider location):  On-site  Platform used for Video Visit: [facility] Medical Weight Management Note     Liya Moore  MRN:  [id number]  :  1975  [place]:  2025    Dear Physician No Ref-Primary,    I had the pleasure of seeing your patient Liya Moore. She is a 49 year old female who I am continuing to see for treatment of obesity related to:        2024    10:12 AM   --   I have the following health issues associated with obesity Pre-Diabetes    High Blood Pressure    High Cholesterol    Fatty Liver    Hypothyroidism   I have the following symptoms associated with obesity Knee Pain    Depression    Lower Extremity Swelling    Fatigue       Assessment & Plan  Problem List Items Addressed This Visit    None  Visit Diagnoses         Class 2 severe obesity with serious comorbidity and body mass index (BMI) of 37.0 to 37.9 in adult, unspecified obesity type (H)    -  Primary    Relevant Medications    Phentermine-Topiramate 11.25-69 MG CP24      Prediabetes        Relevant Medications    Phentermine-Topiramate 11.25-69 MG CP24      Low vitamin D level        Relevant Medications    Cholecalciferol (VITAMIN D-3) 125 MCG (5000 UT) TABS           Continue Qsymia 11.25/69mg in the morning. Refills sent   Start Vitamin D 5000IU once daily.   Mindy Morgan PA-C in 3 months         INTERVAL HISTORY:  New MWM with Mindy Morgan PA-C on 24.   Overweight onset after pregnancies, then was diagnosed with   hypothyroidism which was associated with further weight gain.   Before pregnancies weighed 115-125 lbs. After pregnancies, saw rapid weight gain with diagnosis with the hypothyroidism, recalls 30-40lbs weight. Has since seen gradual weight gain, current weight of 213lbs is highest weight in life.   Started Qsymia      She has not weighed herself in a while, but she does feel like she is losing weight. Clothes are fitting looser.     Anti-obesity medication history    Current:   Qsymia 11.25/69mg - with dose increase had side effects of nausea and blurry vision for the 1 weeks. It has resolved. Currently has no side effects. Dose increase has gone well overall. Does think it has been helpful with weight loss.     BP at home - 118/82.       Recent diet changes: Eating 3 meals a day. No snacks. Hunger is well controlled. Drinking 3-4L of water daily.   B - protein shake + papaya +/- fruit   L - eggs + vegetables   D - chicken or fish + rice + vegetables     Recent exercise/activity changes: Very active at her job. Will walk when able.     Recent stressors: No concerns    Recent sleep changes: No concerns    Vitamins/Labs: Vitamin D 2000IU   Reviewed labs -   A1C decrease 6.3 ->6.3.   Vitamin D increase 19-> 22     CURRENT WEIGHT:   0 lbs 0 oz - does not have an updated weight today.     Initial Weight (lbs): 213.6 lbs  Last Visits Weight: 90.8 kg (200 lb 3.2 oz)              5/29/2025    12:25 PM   Changes and Difficulties   I have made the following changes to my diet since my last visit: no   With regards to my diet, I am still struggling with: no   I have made the following changes to my activity/exercise since my last visit: no   With regards to my activity/exercise, I am still struggling with: no         MEDICATIONS:   Current Outpatient Medications   Medication Sig Dispense Refill     acetaminophen (TYLENOL) 325 MG tablet 2 tablets as needed Orally every 4 hrs       Cholecalciferol (VITAMIN D-3) 125 MCG (5000 UT)  TABS Take 1 capsule by mouth daily. 30 tablet 11     levothyroxine (SYNTHROID/LEVOTHROID) 175 MCG tablet Take 175 mcg by mouth daily.       Phentermine-Topiramate 11.25-69 MG CP24 Take 1 tablet by mouth daily. 30 capsule 4     SUMAtriptan (IMITREX) 25 MG tablet        Vitamin D3 (CHOLECALCIFEROL) 25 mcg (1000 units) tablet Take 1,000 Units by mouth.       citalopram (CELEXA) 20 MG tablet 1 tablet Orally Once a day (Patient not taking: Reported on 5/29/2025)       labetalol (NORMODYNE) 100 MG tablet Take 100 mg by mouth. (Patient not taking: Reported on 5/29/2025)             5/29/2025    12:25 PM   Weight Loss Medication History Reviewed With Patient   Which weight loss medications are you currently taking on a regular basis? Qysmia (phentermine/topiramate)   Are you having any side effects from the weight loss medication that we have prescribed you? No       External Order Results on 02/25/2025   Component Date Value Ref Range Status     Cholesterol (External) 02/25/2025 229 (H)  <200 MG/DL Final     HDL Cholesterol (External) 02/25/2025 45 (L)  >=50 MG/DL Final     Triglycerides (External) 02/25/2025 307 (H)  <150 MG/DL Final     LDL-Cholesterol (External) 02/25/2025 140 (H)  <100 MG/DL Final     Protein Total (External) 02/25/2025 7.8  6.1 - 8.1 G/DL Final     Albumin (External) 02/25/2025 4.5  3.6 - 5.1 G/DL Final     Bilirubin Total (External) 02/25/2025 0.6  0.2 - 1.2 MG/DL Final     Alk Phosphatase (External) 02/25/2025 124  31 - 125 U/L Final     AST (External) 02/25/2025 17  10 - 35 U/L Final     ALT (External) 02/25/2025 20  6 - 29 U/L Final     Glucose (External) 02/25/2025 90  65 - 99 MG/DL Final     Urea Nitrogen (External) 02/25/2025 19  7 - 25 MG/DL Final     Creatinine (External) 02/25/2025 0.76  0.50 - 0.99 MG/DL Final     GFR Estimated (External) 02/25/2025 96  > or = 60 ML/MIN/1.73M2 Final     Sodium (External) 02/25/2025 139  135 - 146 MMOL/L Final     Potassium (External) 02/25/2025 4.5  3.5 -  "5.3 MMOL/L Final     Chloride (External) 02/25/2025 107  98 - 110 MMOL/L Final     CO2 (External) 02/25/2025 23  20 - 32 MMOL/L Final     Calcium (External) 02/25/2025 9.6  8.6 - 10.2 MG/DL Final     Absolute Neutrophils (External) 02/25/2025 3,137  1,500 - 7,800 CELLS/UL Final     Absolute Lymphocytes (External) 02/25/2025 2,375  850 - 3,900 CELLS/UL Final     Absolute Monocytes (External) 02/25/2025 496  200 - 950 CELLS/UL Final     Absolute Eosinophils (External) 02/25/2025 143  15 - 500 CELLS/UL Final     Absolute Basophils (External) 02/25/2025 50  0 - 200 CELLS/UL Final     % Neutrophils (External) 02/25/2025 50.6  % Final     % Lymphocytes (External) 02/25/2025 38.3  % Final     % Monocytes (External) 02/25/2025 8.0  % Final     % Eosinophils (External) 02/25/2025 2.3  % Final     % Basophils (External) 02/25/2025 0.8  % Final     WBC Count (External) 02/25/2025 6.2  3.8 - 10.8 THOUSAND/UL Final     RBC Count (External) 02/25/2025 4.65  3.80 - 5.10 Million/uL Final     Hemoglobin (External) 02/25/2025 13.8  11.7 - 15.5 G/DL Final     Hematocrit (External) 02/25/2025 41.6  35.4 - 45.0 % Final     MCV (External) 02/25/2025 89.5  80.0 - 100.0 FL Final     MCH (External) 02/25/2025 29.7  27.0 - 33.0 PG Final     MCHC (External) 02/25/2025 33.2  32.0 - 36.0 G/DL Final     RDW (External) 02/25/2025 12.3  11.0 - 15.0 % Final     Platelet Count (External) 02/25/2025 312  140 - 400 THOUSAND/UL Final     Parathyroid Hormone Intact (Extern* 02/25/2025 55  16 - 77 PG/ML Final     TSH (External) 02/25/2025 0.39  See scan mIU/L Final     Thyroxine Free (External) 02/25/2025 1.6  0.8 - 1.8 ng/dL Final     Vitamin D Deficiency Screening (Ex* 02/25/2025 22 (L)  30 - 100 NG/Ml Final     Hemoglobin A1C (External) 02/25/2025 6.0 (H)  <5.7 % Final           Objective   Ht 1.613 m (5' 3.5\")   BMI 34.90 kg/m           Vitals:  No vitals were obtained today due to virtual visit.      PHYSICAL EXAM:  GENERAL: alert and no " distress  EYES: Eyes grossly normal to inspection.  No discharge or erythema, or obvious scleral/conjunctival abnormalities.  RESP: No audible wheeze, cough, or visible cyanosis.    SKIN: Visible skin clear. No significant rash, abnormal pigmentation or lesions.  NEURO: Cranial nerves grossly intact.  Mentation and speech appropriate for age.  PSYCH: Appropriate affect, tone, and pace of words        Sincerely,    Latricia Bryant PA-C      24 minutes spent by me on the date of the encounter doing chart review, history and exam, documentation and further activities per the note        Again, thank you for allowing me to participate in the care of your patient.      Sincerely,    Latricia Bryant PA-C

## 2025-05-31 ENCOUNTER — MYC REFILL (OUTPATIENT)
Dept: ENDOCRINOLOGY | Facility: CLINIC | Age: 50
End: 2025-05-31
Payer: COMMERCIAL

## 2025-05-31 ENCOUNTER — MYC MEDICAL ADVICE (OUTPATIENT)
Dept: ENDOCRINOLOGY | Facility: CLINIC | Age: 50
End: 2025-05-31
Payer: COMMERCIAL

## 2025-05-31 DIAGNOSIS — E66.812 CLASS 2 SEVERE OBESITY WITH SERIOUS COMORBIDITY AND BODY MASS INDEX (BMI) OF 37.0 TO 37.9 IN ADULT, UNSPECIFIED OBESITY TYPE (H): ICD-10-CM

## 2025-05-31 DIAGNOSIS — R73.03 PREDIABETES: ICD-10-CM

## 2025-05-31 DIAGNOSIS — E66.01 CLASS 2 SEVERE OBESITY WITH SERIOUS COMORBIDITY AND BODY MASS INDEX (BMI) OF 37.0 TO 37.9 IN ADULT, UNSPECIFIED OBESITY TYPE (H): ICD-10-CM
